# Patient Record
Sex: MALE | Race: WHITE | Employment: UNEMPLOYED | URBAN - METROPOLITAN AREA
[De-identification: names, ages, dates, MRNs, and addresses within clinical notes are randomized per-mention and may not be internally consistent; named-entity substitution may affect disease eponyms.]

---

## 2022-01-06 ENCOUNTER — HOSPITAL ENCOUNTER (INPATIENT)
Age: 42
LOS: 7 days | Discharge: OTHER INSTITUTION | DRG: 897 | End: 2022-01-13
Attending: PSYCHIATRY & NEUROLOGY | Admitting: PSYCHIATRY & NEUROLOGY
Payer: COMMERCIAL

## 2022-01-06 DIAGNOSIS — F29 PSYCHOSIS, UNSPECIFIED PSYCHOSIS TYPE (HCC): Primary | ICD-10-CM

## 2022-01-06 PROBLEM — F10.959: Status: ACTIVE | Noted: 2022-01-06

## 2022-01-06 LAB
ACETAMINOPHEN LEVEL: <5 UG/ML (ref 10–30)
ALBUMIN SERPL-MCNC: 5 G/DL (ref 3.5–4.6)
ALP BLD-CCNC: 96 U/L (ref 35–104)
ALT SERPL-CCNC: 21 U/L (ref 0–41)
AMPHETAMINE SCREEN, URINE: POSITIVE
ANION GAP SERPL CALCULATED.3IONS-SCNC: 10 MEQ/L (ref 9–15)
AST SERPL-CCNC: 17 U/L (ref 0–40)
BARBITURATE SCREEN URINE: ABNORMAL
BASOPHILS ABSOLUTE: 0 K/UL (ref 0–0.2)
BASOPHILS RELATIVE PERCENT: 0.4 %
BENZODIAZEPINE SCREEN, URINE: ABNORMAL
BILIRUB SERPL-MCNC: 0.5 MG/DL (ref 0.2–0.7)
BILIRUBIN URINE: NEGATIVE
BLOOD, URINE: NEGATIVE
BUN BLDV-MCNC: 17 MG/DL (ref 6–20)
CALCIUM SERPL-MCNC: 10.4 MG/DL (ref 8.5–9.9)
CANNABINOID SCREEN URINE: ABNORMAL
CHLORIDE BLD-SCNC: 96 MEQ/L (ref 95–107)
CHOLESTEROL, TOTAL: 225 MG/DL (ref 0–199)
CLARITY: CLEAR
CO2: 27 MEQ/L (ref 20–31)
COCAINE METABOLITE SCREEN URINE: ABNORMAL
COLOR: YELLOW
CREAT SERPL-MCNC: 1.01 MG/DL (ref 0.7–1.2)
EOSINOPHILS ABSOLUTE: 0 K/UL (ref 0–0.7)
EOSINOPHILS RELATIVE PERCENT: 0.1 %
ETHANOL PERCENT: NORMAL G/DL
ETHANOL: <10 MG/DL (ref 0–0.08)
GFR AFRICAN AMERICAN: >60
GFR NON-AFRICAN AMERICAN: >60
GLOBULIN: 3.1 G/DL (ref 2.3–3.5)
GLUCOSE BLD-MCNC: 137 MG/DL (ref 70–99)
GLUCOSE URINE: NEGATIVE MG/DL
HCT VFR BLD CALC: 48.8 % (ref 42–52)
HDLC SERPL-MCNC: 82 MG/DL (ref 40–59)
HEMOGLOBIN: 16.8 G/DL (ref 14–18)
KETONES, URINE: ABNORMAL MG/DL
LDL CHOLESTEROL CALCULATED: 132 MG/DL (ref 0–129)
LEUKOCYTE ESTERASE, URINE: NEGATIVE
LYMPHOCYTES ABSOLUTE: 0.8 K/UL (ref 1–4.8)
LYMPHOCYTES RELATIVE PERCENT: 6.9 %
Lab: ABNORMAL
MCH RBC QN AUTO: 30.4 PG (ref 27–31.3)
MCHC RBC AUTO-ENTMCNC: 34.3 % (ref 33–37)
MCV RBC AUTO: 88.5 FL (ref 80–100)
METHADONE SCREEN, URINE: ABNORMAL
MONOCYTES ABSOLUTE: 0.7 K/UL (ref 0.2–0.8)
MONOCYTES RELATIVE PERCENT: 6.5 %
NEUTROPHILS ABSOLUTE: 9.4 K/UL (ref 1.4–6.5)
NEUTROPHILS RELATIVE PERCENT: 86.1 %
NITRITE, URINE: NEGATIVE
OPIATE SCREEN URINE: ABNORMAL
OXYCODONE URINE: ABNORMAL
PDW BLD-RTO: 13.5 % (ref 11.5–14.5)
PH UA: 5.5 (ref 5–9)
PHENCYCLIDINE SCREEN URINE: ABNORMAL
PLATELET # BLD: 395 K/UL (ref 130–400)
POTASSIUM SERPL-SCNC: 4.4 MEQ/L (ref 3.4–4.9)
PROPOXYPHENE SCREEN: ABNORMAL
PROTEIN UA: NEGATIVE MG/DL
RBC # BLD: 5.52 M/UL (ref 4.7–6.1)
SALICYLATE, SERUM: <0.3 MG/DL (ref 15–30)
SARS-COV-2, NAAT: NOT DETECTED
SODIUM BLD-SCNC: 133 MEQ/L (ref 135–144)
SPECIFIC GRAVITY UA: 1.03 (ref 1–1.03)
TOTAL CK: 121 U/L (ref 0–190)
TOTAL PROTEIN: 8.1 G/DL (ref 6.3–8)
TRIGL SERPL-MCNC: 56 MG/DL (ref 0–150)
TSH SERPL DL<=0.05 MIU/L-ACNC: 2.24 UIU/ML (ref 0.44–3.86)
URINE REFLEX TO CULTURE: ABNORMAL
UROBILINOGEN, URINE: 1 E.U./DL
WBC # BLD: 10.9 K/UL (ref 4.8–10.8)

## 2022-01-06 PROCEDURE — 82077 ASSAY SPEC XCP UR&BREATH IA: CPT

## 2022-01-06 PROCEDURE — 84443 ASSAY THYROID STIM HORMONE: CPT

## 2022-01-06 PROCEDURE — 96372 THER/PROPH/DIAG INJ SC/IM: CPT

## 2022-01-06 PROCEDURE — 99284 EMERGENCY DEPT VISIT MOD MDM: CPT

## 2022-01-06 PROCEDURE — 80179 DRUG ASSAY SALICYLATE: CPT

## 2022-01-06 PROCEDURE — 87635 SARS-COV-2 COVID-19 AMP PRB: CPT

## 2022-01-06 PROCEDURE — 85025 COMPLETE CBC W/AUTO DIFF WBC: CPT

## 2022-01-06 PROCEDURE — 80143 DRUG ASSAY ACETAMINOPHEN: CPT

## 2022-01-06 PROCEDURE — 1240000000 HC EMOTIONAL WELLNESS R&B

## 2022-01-06 PROCEDURE — 80307 DRUG TEST PRSMV CHEM ANLYZR: CPT

## 2022-01-06 PROCEDURE — 80053 COMPREHEN METABOLIC PANEL: CPT

## 2022-01-06 PROCEDURE — 36415 COLL VENOUS BLD VENIPUNCTURE: CPT

## 2022-01-06 PROCEDURE — 81003 URINALYSIS AUTO W/O SCOPE: CPT

## 2022-01-06 PROCEDURE — 6360000002 HC RX W HCPCS

## 2022-01-06 PROCEDURE — 82550 ASSAY OF CK (CPK): CPT

## 2022-01-06 PROCEDURE — 80061 LIPID PANEL: CPT

## 2022-01-06 RX ORDER — HALOPERIDOL 5 MG
5 TABLET ORAL EVERY 6 HOURS PRN
Status: DISCONTINUED | OUTPATIENT
Start: 2022-01-06 | End: 2022-01-13 | Stop reason: HOSPADM

## 2022-01-06 RX ORDER — HALOPERIDOL 5 MG/ML
10 INJECTION INTRAMUSCULAR ONCE
Status: COMPLETED | OUTPATIENT
Start: 2022-01-06 | End: 2022-01-06

## 2022-01-06 RX ORDER — LORAZEPAM 2 MG/ML
INJECTION INTRAMUSCULAR
Status: COMPLETED
Start: 2022-01-06 | End: 2022-01-06

## 2022-01-06 RX ORDER — LORAZEPAM 2 MG/ML
2 INJECTION INTRAMUSCULAR ONCE
Status: COMPLETED | OUTPATIENT
Start: 2022-01-06 | End: 2022-01-06

## 2022-01-06 RX ORDER — ACETAMINOPHEN 325 MG/1
650 TABLET ORAL EVERY 4 HOURS PRN
Status: DISCONTINUED | OUTPATIENT
Start: 2022-01-06 | End: 2022-01-13 | Stop reason: HOSPADM

## 2022-01-06 RX ORDER — LORAZEPAM 2 MG/ML
2 INJECTION INTRAMUSCULAR ONCE
Status: DISCONTINUED | OUTPATIENT
Start: 2022-01-06 | End: 2022-01-06

## 2022-01-06 RX ORDER — DIPHENHYDRAMINE HYDROCHLORIDE 50 MG/ML
50 INJECTION INTRAMUSCULAR; INTRAVENOUS ONCE
Status: DISCONTINUED | OUTPATIENT
Start: 2022-01-06 | End: 2022-01-06

## 2022-01-06 RX ORDER — HALOPERIDOL 5 MG/ML
5 INJECTION INTRAMUSCULAR EVERY 6 HOURS PRN
Status: DISCONTINUED | OUTPATIENT
Start: 2022-01-06 | End: 2022-01-06

## 2022-01-06 RX ORDER — TRAZODONE HYDROCHLORIDE 50 MG/1
50 TABLET ORAL NIGHTLY PRN
Status: DISCONTINUED | OUTPATIENT
Start: 2022-01-06 | End: 2022-01-12

## 2022-01-06 RX ORDER — BENZTROPINE MESYLATE 1 MG/ML
2 INJECTION INTRAMUSCULAR; INTRAVENOUS 2 TIMES DAILY PRN
Status: DISCONTINUED | OUTPATIENT
Start: 2022-01-06 | End: 2022-01-13 | Stop reason: HOSPADM

## 2022-01-06 RX ORDER — HYDROXYZINE HYDROCHLORIDE 50 MG/ML
50 INJECTION, SOLUTION INTRAMUSCULAR EVERY 6 HOURS PRN
Status: DISCONTINUED | OUTPATIENT
Start: 2022-01-06 | End: 2022-01-13 | Stop reason: HOSPADM

## 2022-01-06 RX ORDER — MAGNESIUM HYDROXIDE/ALUMINUM HYDROXICE/SIMETHICONE 120; 1200; 1200 MG/30ML; MG/30ML; MG/30ML
30 SUSPENSION ORAL PRN
Status: DISCONTINUED | OUTPATIENT
Start: 2022-01-06 | End: 2022-01-13 | Stop reason: HOSPADM

## 2022-01-06 RX ORDER — HYDROXYZINE PAMOATE 50 MG/1
50 CAPSULE ORAL EVERY 6 HOURS PRN
Status: DISCONTINUED | OUTPATIENT
Start: 2022-01-06 | End: 2022-01-13 | Stop reason: HOSPADM

## 2022-01-06 RX ORDER — HALOPERIDOL 5 MG/ML
INJECTION INTRAMUSCULAR
Status: COMPLETED
Start: 2022-01-06 | End: 2022-01-06

## 2022-01-06 RX ORDER — HALOPERIDOL 5 MG/ML
5 INJECTION INTRAMUSCULAR EVERY 6 HOURS PRN
Status: DISCONTINUED | OUTPATIENT
Start: 2022-01-06 | End: 2022-01-13 | Stop reason: HOSPADM

## 2022-01-06 RX ORDER — DIPHENHYDRAMINE HYDROCHLORIDE 50 MG/ML
INJECTION INTRAMUSCULAR; INTRAVENOUS
Status: COMPLETED
Start: 2022-01-06 | End: 2022-01-06

## 2022-01-06 RX ORDER — DIPHENHYDRAMINE HYDROCHLORIDE 50 MG/ML
50 INJECTION INTRAMUSCULAR; INTRAVENOUS ONCE
Status: COMPLETED | OUTPATIENT
Start: 2022-01-06 | End: 2022-01-06

## 2022-01-06 RX ORDER — NICOTINE 21 MG/24HR
1 PATCH, TRANSDERMAL 24 HOURS TRANSDERMAL DAILY
Status: DISCONTINUED | OUTPATIENT
Start: 2022-01-06 | End: 2022-01-13 | Stop reason: HOSPADM

## 2022-01-06 RX ADMIN — HALOPERIDOL 10 MG: 5 INJECTION INTRAMUSCULAR at 11:46

## 2022-01-06 RX ADMIN — DIPHENHYDRAMINE HYDROCHLORIDE 50 MG: 50 INJECTION, SOLUTION INTRAMUSCULAR; INTRAVENOUS at 11:47

## 2022-01-06 RX ADMIN — LORAZEPAM 2 MG: 2 INJECTION INTRAMUSCULAR at 11:49

## 2022-01-06 RX ADMIN — HALOPERIDOL LACTATE 10 MG: 5 INJECTION, SOLUTION INTRAMUSCULAR at 11:46

## 2022-01-06 RX ADMIN — LORAZEPAM 2 MG: 2 INJECTION INTRAMUSCULAR; INTRAVENOUS at 11:49

## 2022-01-06 RX ADMIN — DIPHENHYDRAMINE HYDROCHLORIDE 50 MG: 50 INJECTION INTRAMUSCULAR; INTRAVENOUS at 11:47

## 2022-01-06 ASSESSMENT — ENCOUNTER SYMPTOMS
EYE PAIN: 0
SHORTNESS OF BREATH: 0
PHOTOPHOBIA: 0
BACK PAIN: 0
NAUSEA: 0
ABDOMINAL PAIN: 0
SORE THROAT: 0
RHINORRHEA: 0
COUGH: 0
DIARRHEA: 0
VOMITING: 0

## 2022-01-06 ASSESSMENT — SLEEP AND FATIGUE QUESTIONNAIRES
SLEEP PATTERN: DIFFICULTY FALLING ASLEEP;RESTLESSNESS;INSOMNIA
AVERAGE NUMBER OF SLEEP HOURS: 4
DO YOU HAVE DIFFICULTY SLEEPING: YES
RESTFUL SLEEP: NO
DIFFICULTY FALLING ASLEEP: YES
DIFFICULTY ARISING: NO
DO YOU USE A SLEEP AID: NO
DIFFICULTY STAYING ASLEEP: YES
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
## 2022-01-06 NOTE — ED NOTES
Resting on bed. Deep regular respirations. No s/s of distress. Yoly Waterman  01/06/22 7893 [facility]  Frank Waterman  01/06/22 6295
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
## 2022-01-06 NOTE — PROGRESS NOTES
Pt admitted to 3W from [facility]. Skin check and contraband check completed with Keila Franks RN. Both negative. Pt oriented to room. Pt did not want to leave the room for tour due to paranoia. Pt did not want to go to day room for meal. Pt meal brought to his room and pt ate all of his dinner. Pt educated on processes of unit. Pt aware of admission process and said he was willing to talk to this nurse.

## 2022-01-06 NOTE — ED NOTES
Information sent to [facility] at [facility] to request indigent bed. Dianne Valdez  01/06/22 0945

## 2022-01-06 NOTE — ED PROVIDER NOTES
[facility] ED  eMERGENCY dEPARTMENTeNCOUnter      Pt Name: Venita Layne  MRN: [id number]  [place] 1980  Date ofevaluation: 1/6/2022  Provider: Jass Velasco PA-C    CHIEF COMPLAINT       Chief Complaint   Patient presents with    Suicidal     sitting on bridge with feet over railing when passer by stopped and talked him away         HISTORY OF PRESENT ILLNESS   (Location/Symptom, Timing/Onset,Context/Setting, Quality, Duration, Modifying Factors, Severity)  Note limiting factors. Venita Layne is a 39 y.o. male who presents to the emergency department suicidal ideation and paranoia. Pt did methamphetimines. He went to a bridge to kill himself and someone brought him here. He is yelling and saying we are racist. He also expresses that a cartel is after him. HPI    NursingNotes were reviewed. REVIEW OF SYSTEMS    (2-9 systems for level 4, 10 or more for level 5)     Review of Systems   Constitutional: Negative for chills, diaphoresis, fatigue and fever. HENT: Negative for congestion, rhinorrhea and sore throat. Eyes: Negative for photophobia and pain. Respiratory: Negative for cough and shortness of breath. Cardiovascular: Negative for chest pain and palpitations. Gastrointestinal: Negative for abdominal pain, diarrhea, nausea and vomiting. Genitourinary: Negative for dysuria and flank pain. Musculoskeletal: Negative for back pain. Skin: Negative for rash. Neurological: Negative for dizziness, light-headedness and headaches. Psychiatric/Behavioral: Positive for behavioral problems, hallucinations and suicidal ideas. All other systems reviewed and are negative. Except as noted above the remainder of the review of systems was reviewed and negative. PAST MEDICAL HISTORY   No past medical history on file.       SURGICALHISTORY       Past Surgical History:   Procedure Laterality Date    HYDROCELE EXCISION           CURRENT MEDICATIONS       Previous Medications    No medications on file       ALLERGIES     Patient has no known allergies. FAMILY HISTORY     No family history on file. SOCIAL HISTORY       Social History     Socioeconomic History    Marital status: Single     Spouse name: Not on file    Number of children: Not on file    Years of education: Not on file    Highest education level: Not on file   Occupational History    Not on file   Tobacco Use    Smoking status: Current Every Day Smoker     Packs/day: 0.75     Years: 15.00     Pack years: 11.25     Types: Cigarettes    Smokeless tobacco: Never Used   Vaping Use    Vaping Use: Never used   Substance and Sexual Activity    Alcohol use: Never    Drug use: Yes     Types: Methamphetamines (Crystal Meth)    Sexual activity: Not on file   Other Topics Concern    Not on file   Social History Narrative    Not on file     Social Determinants of Health     Financial Resource Strain:     Difficulty of Paying Living Expenses: Not on file   Food Insecurity:     Worried About Running Out of Food in the Last Year: Not on file    Rina of Food in the Last Year: Not on file   Transportation Needs:     Lack of Transportation (Medical): Not on file    Lack of Transportation (Non-Medical):  Not on file   Physical Activity:     Days of Exercise per Week: Not on file    Minutes of Exercise per Session: Not on file   Stress:     Feeling of Stress : Not on file   Social Connections:     Frequency of Communication with Friends and Family: Not on file    Frequency of Social Gatherings with Friends and Family: Not on file    Attends Voodoo Services: Not on file    Active Member of Clubs or Organizations: Not on file    Attends Club or Organization Meetings: Not on file    Marital Status: Not on file   Intimate Partner Violence:     Fear of Current or Ex-Partner: Not on file    Emotionally Abused: Not on file    Physically Abused: Not on file    Sexually Abused: Not on file   Housing Stability:     Unable to Pay for Housing in the Last Year: Not on file    Number of Places Lived in the Last Year: Not on file    Unstable Housing in the Last Year: Not on file       SCREENINGS      @FLOW([id number])@      PHYSICAL EXAM    (up to 7 for level 4, 8 or more for level 5)     ED Triage Vitals [01/06/22 0858]   BP Temp Temp Source Pulse Resp SpO2 Height Weight   (!) 160/95 97.6 °F (36.4 °C) Oral 105 18 95 % 5' 11\" (1.803 m) 165 lb (74.8 kg)       Physical Exam  Vitals and nursing note reviewed. Constitutional:       General: He is not in acute distress. Appearance: Normal appearance. He is well-developed. He is not diaphoretic. HENT:      Head: Normocephalic and atraumatic. Eyes:      General: Lids are normal.      Conjunctiva/sclera: Conjunctivae normal.   Cardiovascular:      Rate and Rhythm: Normal rate and regular rhythm. Pulses: Normal pulses. Heart sounds: Normal heart sounds. Pulmonary:      Effort: Pulmonary effort is normal.      Breath sounds: Normal breath sounds. Abdominal:      General: Bowel sounds are normal.      Palpations: Abdomen is soft. Tenderness: There is no abdominal tenderness. Musculoskeletal:      Cervical back: Normal range of motion and neck supple. Lymphadenopathy:      Cervical: No cervical adenopathy. Skin:     General: Skin is warm and dry. Capillary Refill: Capillary refill takes less than 2 seconds. Findings: No rash. Neurological:      Mental Status: He is alert and oriented to person, place, and time. Psychiatric:         Speech: Speech is tangential.         Behavior: Behavior is agitated. Thought Content: Thought content includes suicidal ideation. Thought content includes suicidal plan. Judgment: Judgment is impulsive and inappropriate.          DIAGNOSTIC RESULTS     EKG: All EKG's are interpreted by the Emergency Department Physician who either signs or Co-signsthis chart in the absence of a cardiologist.        RADIOLOGY:   [place] such as CT, Ultrasound and MRI are read by the radiologist. Plain radiographic images are visualized and preliminarily interpreted by the emergency physician with the below findings:        Interpretation per the Radiologist below, if available at the time ofthis note:    No orders to display         ED BEDSIDE ULTRASOUND:   Performed by ED Physician - none    LABS:  Labs Reviewed   ACETAMINOPHEN LEVEL - Abnormal; Notable for the following components:       Result Value    Acetaminophen Level <5 (*)     All other components within normal limits   CBC WITH AUTO DIFFERENTIAL - Abnormal; Notable for the following components:    WBC 10.9 (*)     Neutrophils Absolute 9.4 (*)     Lymphocytes Absolute 0.8 (*)     All other components within normal limits   COMPREHENSIVE METABOLIC PANEL - Abnormal; Notable for the following components:    Sodium 133 (*)     Glucose 137 (*)     Calcium 10.4 (*)     Total Protein 8.1 (*)     Albumin 5.0 (*)     All other components within normal limits   LIPID PANEL - Abnormal; Notable for the following components:    Cholesterol, Total 225 (*)     HDL 82 (*)     LDL Calculated 132 (*)     All other components within normal limits   SALICYLATE LEVEL - Abnormal; Notable for the following components:    Salicylate, Serum <9.1 (*)     All other components within normal limits   URINE DRUG SCREEN - Abnormal; Notable for the following components:    Amphetamine Screen, Urine POSITIVE (*)     All other components within normal limits   URINE RT REFLEX TO CULTURE - Abnormal; Notable for the following components:    Ketones, Urine TRACE (*)     All other components within normal limits   COVID-19, RAPID   CK   ETHANOL   TSH WITHOUT REFLEX       All other labs were within normal range or not returned as of this dictation.     EMERGENCY DEPARTMENT COURSE and DIFFERENTIAL DIAGNOSIS/MDM:   Vitals:    Vitals:    01/06/22 0858   BP: (!) 160/95   Pulse: 105   Resp: 18   Temp: 97.6 °F (36.4 °C)   TempSrc: Oral   SpO2: 95%   Weight: 165 lb (74.8 kg)   Height: 5' 11\" (1.803 m)           MDM  Pt pink slipped. Pt became agitated tried to escape dariel, tried to verbally calm him, unsuccessful, medication was ordered by Dr. Aric Diaz   Pt admitted to [facility] 30 time was  minutes, excluding separatelyreportable procedures. There was a high probability ofclinically significant/life threatening deterioration in the patient's condition which required my urgent intervention. CONSULTS:  IP CONSULT TO HOSPITALIST  IP CONSULT TO SOCIAL WORK    PROCEDURES:  Unless otherwise noted below, none     Procedures    FINAL IMPRESSION      1. Psychosis, unspecified psychosis type [facility])          DISPOSITION/PLAN   DISPOSITION Admitted 01/06/2022 04:39:58 PM      PATIENT REFERREDTO:  No follow-up provider specified.     DISCHARGEMEDICATIONS:  New Prescriptions    No medications on file          (Please note that portions of this note were completed with a voice recognition program.  Efforts were made to edit the dictations but occasionally words are mis-transcribed.)    Cheryle Lovelace PA-C (electronically signed)  Attending Emergency Physician         Cheryle Lovelace PA-C  01/06/22 3054

## 2022-01-06 NOTE — PROGRESS NOTES
Received report from [place] in the [facility]. Pt to admit to 3W, room 368.

## 2022-01-07 PROCEDURE — 1240000000 HC EMOTIONAL WELLNESS R&B

## 2022-01-07 PROCEDURE — 6370000000 HC RX 637 (ALT 250 FOR IP): Performed by: PSYCHIATRY & NEUROLOGY

## 2022-01-07 PROCEDURE — 99223 1ST HOSP IP/OBS HIGH 75: CPT | Performed by: PSYCHIATRY & NEUROLOGY

## 2022-01-07 RX ORDER — RISPERIDONE 1 MG/1
1 TABLET, FILM COATED ORAL 2 TIMES DAILY
Status: DISCONTINUED | OUTPATIENT
Start: 2022-01-07 | End: 2022-01-10

## 2022-01-07 RX ADMIN — RISPERIDONE 1 MG: 1 TABLET ORAL at 12:17

## 2022-01-07 RX ADMIN — HYDROXYZINE PAMOATE 50 MG: 50 CAPSULE ORAL at 08:25

## 2022-01-07 RX ADMIN — RISPERIDONE 1 MG: 1 TABLET ORAL at 21:00

## 2022-01-07 ASSESSMENT — LIFESTYLE VARIABLES: HISTORY_ALCOHOL_USE: NO

## 2022-01-07 NOTE — PROGRESS NOTES
Pt remains sleeping in his bed, noted to have eaten a good breakfast, denied any complaints, encouraged groups.

## 2022-01-07 NOTE — PROGRESS NOTES
Pt ate 100% lunch, is now sleeping, resp even unlabored Risperdal 1mg given 1217 , pt asked if it makes his mens breast get large. pt was encouraged to talk with doctor about it.

## 2022-01-07 NOTE — PROGRESS NOTES
Behavioral Services  Medicare Certification Upon Admission    I certify that this patient's inpatient psychiatric hospital admission is medically necessary for:    [x] (1) Treatment which could reasonably be expected to improve this patient's condition,       [x] (2) Or for diagnostic study;     AND     [x](2) The inpatient psychiatric services are provided while the individual is under the care of a physician and are included in the individualized plan of care.     Estimated length of stay/service 3-5 days    Plan for post-hospital care OP care    Electronically signed by Lory Marquis MD on 1/7/2022 at 11:26 AM

## 2022-01-07 NOTE — CARE COORDINATION
Brief Intervention and Referral to Treatment Summary    Patient was provided PHQ-9, AUDIT and DAST Screening:      PHQ-9 Score: NC  AUDIT Score:  0  DAST Score:  0    Patients substance use is considered     Low Risk/Healthy x  Moderate Risk  Harmful  Dependent    Patients depression is considered:     Minimal  Mild   Moderate  Moderately Severe  Severe    Brief Education Was Provided    Patient was receptive  Patient was not receptivex      Brief Intervention Is Provided (Only for AUDIT or DAST)     Patient reports readiness to decrease and/or stop use and a plan was discussed   Patient denies readiness to decrease and/or stop use and a plan was not discussed      Recommendations/Referrals for Brief and/or Specialized Treatment Provided to Patient   Patient was negative for drugs and alcohol. Patient stated he is not connected to anyone.
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
## 2022-01-07 NOTE — PROGRESS NOTES
Pt abruptly got up from laying in bed, agreeable to a recheck in vs 107/70,132 ,pt is tense , pt expressed paranoia, stated they are out to get me, reports I don't have a home, doesn't sleep well, reports depression and anxiety #10, denies any suicidal thoughts, informed breakfast is coming soon.

## 2022-01-07 NOTE — PROGRESS NOTES
Patient did not attend wellness group despite encouragement by staff. Electronically signed by Abimbola Mayo on 1/7/2022 at 2:02 PM
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
## 2022-01-07 NOTE — CONSULTS
[facility]  MEDICINE    HISTORY AND PHYSICAL EXAM    PATIENT NAME:  Ivone Trinidad    MRN:  [id number]  SERVICE DATE:  1/7/2022   SERVICE TIME:  4:15 PM    Primary Care Physician: No primary care provider on file. SUBJECTIVE  CHIEF COMPLAINT:  Medically appropriate for inpatient psychiatry admission. Consult for medical H/P encounter. HPI:  This is a 39 y.o. male who presents with  PMHx methamphetamine use  presented to emergency room with SI and paranoid. Has been using methamphetamines. He was a bridge to kill himself and a bystander brought him to hospital.  Patient cleared from emergency room for psychiatric care. Patient Seen, Chart, Labs, Radiologystudies, and Consults reviewed. Patient denies headache, chest pain, shortness of breath, N/V/D/C, fever/chills. PAST MEDICAL HISTORY:  History reviewed. No pertinent past medical history. PAST SURGICAL HISTORY:    Past Surgical History:   Procedure Laterality Date    HYDROCELE EXCISION       FAMILY HISTORY:  History reviewed. No pertinent family history.   SOCIAL HISTORY:    Social History     Socioeconomic History    Marital status: Single     Spouse name: Not on file    Number of children: Not on file    Years of education: Not on file    Highest education level: Not on file   Occupational History    Not on file   Tobacco Use    Smoking status: Current Every Day Smoker     Packs/day: 0.75     Years: 15.00     Pack years: 11.25     Types: Cigarettes    Smokeless tobacco: Never Used   Vaping Use    Vaping Use: Never used   Substance and Sexual Activity    Alcohol use: Never    Drug use: Yes     Types: Methamphetamines (Crystal Meth)    Sexual activity: Not on file   Other Topics Concern    Not on file   Social History Narrative    Not on file     Social Determinants of Health     Financial Resource Strain:     Difficulty of Paying Living Expenses: Not on file   Food Insecurity:     Worried About Running Out of Food in the Last Year: Not on file    Ran Out of Food in the Last Year: Not on file   Transportation Needs:     Lack of Transportation (Medical): Not on file    Lack of Transportation (Non-Medical):  Not on file   Physical Activity:     Days of Exercise per Week: Not on file    Minutes of Exercise per Session: Not on file   Stress:     Feeling of Stress : Not on file   Social Connections:     Frequency of Communication with Friends and Family: Not on file    Frequency of Social Gatherings with Friends and Family: Not on file    Attends Taoism Services: Not on file    Active Member of [address] [place] [address] [employer] or Organizations: Not on file    Attends Club or Organization Meetings: Not on file    Marital Status: Not on file   Intimate Partner Violence:     Fear of Current or Ex-Partner: Not on file    Emotionally Abused: Not on file    Physically Abused: Not on file    Sexually Abused: Not on file   Housing Stability:     Unable to Pay for Housing in the Last Year: Not on file    Number of [place] in the Last Year: Not on file    Unstable Housing in the Last Year: Not on file     MEDICATIONS:    Current Facility-Administered Medications   Medication Dose Route Frequency Provider Last Rate Last Admin    risperiDONE (RISPERDAL) tablet 1 mg  1 mg Oral BID Ruth Layne MD   1 mg at 01/07/22 1217    acetaminophen (TYLENOL) tablet 650 mg  650 mg Oral Q4H PRN Ruth Layne MD        magnesium hydroxide (MILK OF MAGNESIA) 400 MG/5ML suspension 30 mL  30 mL Oral Daily PRN Ruth Layne MD        aluminum & magnesium hydroxide-simethicone (MAALOX) 200-200-20 MG/5ML suspension 30 mL  30 mL Oral PRN Ruth Layne MD        haloperidol (HALDOL) tablet 5 mg  5 mg Oral Q6H PRN Ruth Layne MD        Or    haloperidol lactate (HALDOL) injection 5 mg  5 mg IntraMUSCular Q6H PRN Ruth Layne MD        benztropine mesylate (COGENTIN) injection 2 mg  2 mg IntraMUSCular BID PRMD Gabriela Mcneal hydrOXYzine (VISTARIL) injection 50 mg  50 mg IntraMUSCular Q6H PRN Dawna Orellana MD        Or    hydrOXYzine (VISTARIL) capsule 50 mg  50 mg Oral Q6H PRN Dawna Orellana MD   50 mg at 01/07/22 0825    traZODone (DESYREL) tablet 50 mg  50 mg Oral Nightly PRN Dawna Orellana MD        nicotine (NICODERM CQ) 21 MG/24HR 1 patch  1 patch TransDERmal Daily Dawna Orellana MD        influenza quadrivalent split vaccine (FLUZONE;FLUARIX;FLULAVAL;AFLURIA) injection 0.5 mL  0.5 mL IntraMUSCular Prior to discharge Kevin Benítez APRN - NP           ALLERGIES: Patient has no known allergies. REVIEW OF SYSTEM:   ROS as noted in HPI, 12 point ROS reviewed and otherwise negative. OBJECTIVE  PHYSICAL EXAM: /70   Pulse 132 Comment: anxiety per pt, they are out to get me  Temp 97.5 °F (36.4 °C)   Resp 18   Ht 5' 11\" (1.803 m)   Wt 165 lb (74.8 kg)   SpO2 96%   BMI 23.01 kg/m²   CONSTITUTIONAL:  awake, alert, cooperative, no apparent distress, and appears stated age  EYES:  Lids and lashes normal, conjunctiva normal  ENT:  Normocephalic, without obvious abnormality, atraumatic, sinuses nontender on palpation, external ears without lesions, oral pharynx with moist mucus membranes, tonsils without erythema or exudates, gums normal and good dentition. NECK:  Supple, symmetrical, trachea midline  LUNGS: Clear to auscultation bilaterally, no crackles or wheezing  CARDIOVASCULAR: Regular rate and rhythm, normal S1 and S2  ABDOMEN:  Normal bowel sounds, soft, non-distended, non-tender  MUSCULOSKELETAL:  There is no redness, warmth, or swelling of the joints. NEUROLOGIC:  Awake, alert, oriented to name, place and time. SKIN: Warm and dry    DATA:     Diagnostic tests reviewed for today's visit:    Most recent labs and imaging results reviewed.       ASSESSMENT AND PLAN    Psychosis, alcoholic, with unspecified complication [facility])  Patient admitted to behavorial health for evaluation and treatment Substance abuse:  on cessation of methamphetamine use    Hyperlipidemia: Dietary changes    Nicotine dependence: Nicoderm patch.  on cessation    This is only a history and physical examination and not medical management. The patient is to contact and follow up with their primary care physician and go over any abnormal labs, imaging, findings, medical concerns, or conditions that we have and have not addressed during this encounter.     Plan of care discussed with: patient    SIGNATURE: SOHAIL Carcamo NP  DATE: January 7, 2022  TIME: 4:15 PM
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
## 2022-01-07 NOTE — FLOWSHEET NOTE
Patient has isolated to room all of day. Patient is irritable, appears suspicious and guarded. Patient is paranoid and feels \"the cartel is out to get me. \"  Patient denies SI/HI and hallucinations. Patient reports poor sleep and appetite is okay when tray taken to room. Patient has poor eye contact, has sad flat affect.

## 2022-01-07 NOTE — PROGRESS NOTES
Pt. declined to attend the 0900 community meeting, despite staff encouragement.  Did not state a goal. Electronically signed by Yulissa Henderson [address] on 1/7/2022 at 9:21 AM

## 2022-01-07 NOTE — PROGRESS NOTES
Patient did not attend group despite staff encouragement.   Electronically signed by Kristen Aguila on 1/6/2022 at 9:01 PM

## 2022-01-07 NOTE — PROGRESS NOTES
Pt. refused to attend the 1000 skills group, despite staff encouragement. Electronically signed by Sree Huffman [address] on 1/7/2022 at 11:21 AM

## 2022-01-07 NOTE — PROGRESS NOTES
Patient did not attend group despite staff encouragement.   Electronically signed by Shailesh Keys on 1/6/2022 at 8:46 PM

## 2022-01-07 NOTE — PROGRESS NOTES
Gave vistaril 50 mg for reports of anxiety #10 , pt denies cravings to use. Breakfast was set at bed side, pt remians in his room.

## 2022-01-08 PROCEDURE — 1240000000 HC EMOTIONAL WELLNESS R&B

## 2022-01-08 PROCEDURE — 6370000000 HC RX 637 (ALT 250 FOR IP): Performed by: PSYCHIATRY & NEUROLOGY

## 2022-01-08 PROCEDURE — 93005 ELECTROCARDIOGRAM TRACING: CPT

## 2022-01-08 RX ADMIN — RISPERIDONE 1 MG: 1 TABLET ORAL at 21:52

## 2022-01-08 RX ADMIN — RISPERIDONE 1 MG: 1 TABLET ORAL at 09:14

## 2022-01-08 NOTE — PROGRESS NOTES
Pt. refused to attend the 1000 skills group, despite staff encouragement. Electronically signed by Jimenez Harper, [address] on 1/8/2022 at 2:17 PM

## 2022-01-08 NOTE — PROGRESS NOTES
Patient did not attend healthy living group despite staff encouragement.  Electronically signed by Kaye Angelo RN on 1/8/22 at 3:30 PM EST

## 2022-01-08 NOTE — PROGRESS NOTES
Pt. declined to attend the 0900 community meeting, despite staff encouragement.  Goal - \"To see the Doctor\" Electronically signed by DOREEN Gilbert on 1/8/2022 at 9:43 AM
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
## 2022-01-08 NOTE — PROGRESS NOTES
Patient did not attend group despite staff encouragement.   Electronically signed by Ten Wade on 1/8/2022 at 5:09 PM

## 2022-01-08 NOTE — FLOWSHEET NOTE
Pt has been isolative to room. Avoids gaze. Guarded. Does not want to engage in conversation. Asks if he would be getting sent to [facility] from here. \" Says he feels fine. Denies SI/HI/AVH, depression and anxiety. Eats meals in his room. Encouraged to shower later. Does not attend groups.
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
## 2022-01-08 NOTE — PROGRESS NOTES
Isolative to room, out for dinner. Reports desire to smoke but declined the the Nicoderm patch. When asked if in pain, ct states, \"My psyche is in pain\", would not elaborate. Appears suspicious and was reading a book earlier but is now asleep requesting lights out. Does not report suicidal ideation at this time.

## 2022-01-08 NOTE — GROUP NOTE
Group Therapy Note    Date: 1/7/2022    Group Start Time: 8436  Group End Time: 1945  Group Topic: Recreational    [facility]    Devika Kumar RN; Rodrigo Slaughter RN    To participate in recreation group playing Wii video games with peers. Group Therapy Note    Attendees: 10/17         Patient's Goal:  To attend group. Notes:  Pt did not attend recreation group despite encouragement from staff.        Signature:  Devika Kumar RN

## 2022-01-09 PROCEDURE — 1240000000 HC EMOTIONAL WELLNESS R&B

## 2022-01-09 PROCEDURE — 6370000000 HC RX 637 (ALT 250 FOR IP): Performed by: PSYCHIATRY & NEUROLOGY

## 2022-01-09 RX ADMIN — RISPERIDONE 1 MG: 1 TABLET ORAL at 21:10

## 2022-01-09 RX ADMIN — RISPERIDONE 1 MG: 1 TABLET ORAL at 09:08

## 2022-01-09 NOTE — CARE COORDINATION
This  was approached by patient wanting to speak to someone from [employer]. Patient was given a pamphlet and asked to speak to someone. Patient signed release of information. I asked patient for collateral information and patient declined. I phoned [employer] today at [phone]. A representative will come out today.     .Electronically signed by CLAUDETTE Ribera on 1/9/2022 at 9:53 AM

## 2022-01-09 NOTE — PROGRESS NOTES
Patient did not attend group despite staff encouragement.   Electronically signed by Nilam Guerrero on 1/8/2022 at 9:57 PM

## 2022-01-09 NOTE — PROGRESS NOTES
Pt. refused to attend the 1000 skills group, despite staff encouragement. Electronically signed by Jennifer Hyde, [address] on 1/9/2022 at 12:03 PM

## 2022-01-09 NOTE — FLOWSHEET NOTE
Pt noted to be playing cards and watching TV with other peers during afternoon. When approached by this nurse, pt stopped abruptly asking if he was in trouble. Pt appears guarded, evasive with nurse. Flat affect. Noted to eat 100% supper. Short with nurse, reports he is doing fine and asked again if he was in trouble. Denies SI/HI/AVH. Noted to look around unit at times suspiciously.

## 2022-01-09 NOTE — PROGRESS NOTES
Morning Bishopssalbert 65 attended the morning community meeting on 1/9/22. Topics discussed today     [x] Introduction   Day of the week and date   Mask distribution   Current mask requirements  [x]Teams   Explanation of  Green and Blue team criteria   Nurses assigned to each team for today   Explanation about green and blue paper  o Date  o Patient's Name  o Patient's Nurse  o Goals  [x] Visitation   Announce the visiting hours for the day   Announce which team is allowed to have visitors for the day   Review any updated Covid 19 requirements for visitors during visitation  o Vaccine Card or negative Covid test within 48 hours of visit  o State Identification   Patients are reminded to alert the  at least 1 hour before visitation   [x] Unit Orientation   Coffee use   Phone location and etiquette   Shower locations  [employer] and dryer location and process   Common area expectations   Staff rounds expectation  [x] Meals    Educate patient to the menu  o The patient is encouraged to fill out the menu to get preferences at mealtime  o The patient is educated that if they do not fill out the menu, they will get the standard tray  o The coffee pot is decaf, patient encouraged to order regular coffee from menu.    Educate patient to the meal process   Patient encouraged to eat snacks provided twice daily  o Snacks may stay in patient room     [x] Discharge Process   Discharge expectations   Fill out the survey after discharge   [x] Hygiene   Daily showers encouraged  o Showers availability discussed    Daily dressing encouraged  o Discussed wearing street clothing   Education provided on where to place linens and clothing  o Linens in the hamper  o personal clothing does not go into the linen hamper  [x] Group    Patient encouraged to attend group provided   Time of Group Meetings discussed   Gentle reminder that attendance is a Physician order  [x] Movement   Chair exercises completed   Stretching completed  Notes:Goal - \"To see a \" Electronically signed by DOREEN Buckner on 1/9/2022 at 9:37 AM

## 2022-01-09 NOTE — PROGRESS NOTES
Pt more visible in dayroom today. Remains isolative to self while in day room. Continues with flat affect but less irritable and more talkative today. Reports \"fair\" sleep but a good appetite. Preoccupied with thoughts of cartel putting out the \"green light\" on him. Explains that the cartel have been ruining his life for 4 years and he is unable to correct this situation. Relates the cartel to his homelessness and lack of job. Denies any SI, HI or AVH. Admits to paranoia causing \"a lot\" of depression and anxiety. During interview, pt noted to be flirtatious with nurse and grandiose at times. States he would like to work with [facility] going forward.

## 2022-01-09 NOTE — GROUP NOTE
Group Therapy Note    Date: 1/9/2022    Group Start Time: 1630  Group End Time: 1700  Group Topic: Healthy Living/Wellness    [facility]    Lawson Cerna        Group Therapy Note    Attendees: 13/20         Patient's Goal:  To learn about self esteem through a game. Notes:  Patient participated in group discussion and game. Patient was supportive of peers.     Status After Intervention:  Improved    Participation Level: Interactive    Participation Quality: Appropriate, Attentive and Sharing      Speech:  normal      Thought Process/Content: Logical      Affective Functioning: Congruent      Mood: euthymic      Level of consciousness:  Alert and Attentive      Response to Learning: Able to verbalize current knowledge/experience      Endings: None Reported    Modes of Intervention: Education      Discipline Responsible: [address], [employer]      Signature:  Lawson Cerna

## 2022-01-09 NOTE — CARE COORDINATION
Group Therapy Note    Date: 1/9/2022  Start Time: 1100  End Time:  1200    Number of Participants:13    Type of Group: Psychotherapy    Patient's Goal:  To participate in a goal oriented group. Notes: Patient declined to attend psychoeducation group at 1100 despite encouragement by staff.      Discipline Responsible: /Counselor    CLAUDETTE Vincent

## 2022-01-09 NOTE — PROGRESS NOTES
Patient did not attend group despite staff encouragement.   Electronically signed by Bob Chun on 1/8/2022 at 9:40 PM

## 2022-01-10 PROBLEM — F22 DELUSIONAL DISORDER (HCC): Status: ACTIVE | Noted: 2022-01-06

## 2022-01-10 PROBLEM — F25.0 SCHIZOAFFECTIVE DISORDER, BIPOLAR TYPE (HCC): Status: ACTIVE | Noted: 2022-01-06

## 2022-01-10 PROBLEM — F15.90 STIMULANT USE DISORDER: Status: ACTIVE | Noted: 2022-01-10

## 2022-01-10 LAB
EKG ATRIAL RATE: 74 BPM
EKG P AXIS: 64 DEGREES
EKG P-R INTERVAL: 162 MS
EKG Q-T INTERVAL: 384 MS
EKG QRS DURATION: 100 MS
EKG QTC CALCULATION (BAZETT): 426 MS
EKG R AXIS: 84 DEGREES
EKG T AXIS: 70 DEGREES
EKG VENTRICULAR RATE: 74 BPM

## 2022-01-10 PROCEDURE — 90833 PSYTX W PT W E/M 30 MIN: CPT | Performed by: PSYCHIATRY & NEUROLOGY

## 2022-01-10 PROCEDURE — 6370000000 HC RX 637 (ALT 250 FOR IP): Performed by: PSYCHIATRY & NEUROLOGY

## 2022-01-10 PROCEDURE — 1240000000 HC EMOTIONAL WELLNESS R&B

## 2022-01-10 PROCEDURE — 99232 SBSQ HOSP IP/OBS MODERATE 35: CPT | Performed by: PSYCHIATRY & NEUROLOGY

## 2022-01-10 RX ADMIN — RISPERIDONE 1.5 MG: 1 TABLET ORAL at 10:12

## 2022-01-10 RX ADMIN — TRAZODONE HYDROCHLORIDE 50 MG: 50 TABLET ORAL at 21:42

## 2022-01-10 RX ADMIN — RISPERIDONE 1.5 MG: 1 TABLET ORAL at 20:43

## 2022-01-10 NOTE — GROUP NOTE
Group Therapy Note    Date: 1/9/2022    Group Start Time: 2030  Group End Time: 2115  Group Topic: Wrap-Up    [facility]    Joe Cevallos        Group Therapy Note    Attendees: 14/20         Patient's Goal:  \"to win the lotto. \" Patient then changed goal to \"talk to the . \"    Notes:  Patient reported meeting their goal for the day. Patient shared enjoying \"being the champion of [facility]. \"    Status After Intervention:  Unchanged    Participation Level: Interactive    Participation Quality: Appropriate, Attentive and Sharing      Speech:  normal      Thought Process/Content: Logical      Affective Functioning: Congruent      Mood: euthymic      Level of consciousness:  Alert and Attentive      Response to Learning: Progressing to goal      Endings: None Reported    Modes of Intervention: Support      Discipline Responsible: Breeze      Signature:  Joe Cevallos

## 2022-01-10 NOTE — GROUP NOTE
Group Therapy Note    Date: 1/10/2022    Group Start Time: 1400  Group End Time: 4248  Group Topic: Cognitive Skills    [facility]    CLAUDETTE Pardo        Group Therapy Note    Attendees: 13         Patient's Goal: To participate in mood management group. Notes:  Patient learned about Lissy's Social Needs. Status After Intervention:  Improved    Participation Level: Active Listener    Participation Quality: Appropriate and Attentive      Speech:  normal      Thought Process/Content: Logical      Affective Functioning: Congruent      Mood: elevated      Level of consciousness:  Alert      Response to Learning: Able to verbalize current knowledge/experience      Endings: None Reported    Modes of Intervention: Education      Discipline Responsible: /Counselor      Signature:   CLAUDETTE Pardo

## 2022-01-10 NOTE — GROUP NOTE
Group Therapy Note    Date: 1/10/2022    Group Start Time: 1100  Group End Time: 1150  Group Topic: Psychotherapy    [facility] ADAM Conrad, [facility]        Group Therapy Note    Attendees: 13       Patient's Goal:  To have a good discharge plan    Notes:  Patient was an active participant    Status After Intervention:  Improved    Participation Level: Interactive    Participation Quality: Appropriate      Speech:  normal      Thought Process/Content: Logical      Affective Functioning: Congruent      Mood: anxious      Level of consciousness:  Alert      Response to Learning: Progressing to goal      Endings: None Reported    Modes of Intervention: Support      Discipline Responsible: /Counselor      Signature:  Leanne Conrad, [facility]

## 2022-01-10 NOTE — PROGRESS NOTES
Nuha Verdugo 89. FOLLOW-UP NOTE       1/10/2022     Patient was seen and examined in person, Chart reviewed   Patient's case discussed with staff/team    Chief Complaint: Psychosis    Interim History:     Pt less focused on the drug cartel  Feel risperdal is helping him  Less anxious and paranoid  Still believe that people are following him and will do that after discharge  Pt was tired of living due to these people  Pt is currently homeless  Pt used Meth for the first time on Thursday after year of sobriety  No alcohol use   Open to sober living place  Lets get real staff coming today   Appetite:   [x] Normal/Unchanged  [] Increased  [] Decreased      Sleep:       [] Normal/Unchanged  [x] Fair       [] Poor              Energy:    [x] Normal/Unchanged  [] Increased  [] Decreased        SI [] Present  [x] Absent    HI  []Present  [x] Absent     Aggression:  [] yes  [] no    Patient is [] able  [] unable to CONTRACT FOR SAFETY     PAST MEDICAL/PSYCHIATRIC HISTORY:   History reviewed. No pertinent past medical history. FAMILY/SOCIAL HISTORY:  History reviewed. No pertinent family history.   Social History     Socioeconomic History    Marital status: Single     Spouse name: Not on file    Number of children: Not on file    Years of education: Not on file    Highest education level: Not on file   Occupational History    Not on file   Tobacco Use    Smoking status: Current Every Day Smoker     Packs/day: 0.75     Years: 15.00     Pack years: 11.25     Types: Cigarettes    Smokeless tobacco: Never Used   Vaping Use    Vaping Use: Never used   Substance and Sexual Activity    Alcohol use: Never    Drug use: Yes     Types: Methamphetamines (Crystal Meth)    Sexual activity: Not on file   Other Topics Concern    Not on file   Social History Narrative    Not on file     Social Determinants of Health     Financial Resource Strain:     Difficulty of Paying Living Expenses: Not on file   Food Insecurity:     Worried About [facility] in the Last Year: Not on file    Rina of Food in the Last Year: Not on file   Transportation Needs:     Lack of Transportation (Medical): Not on file    Lack of Transportation (Non-Medical): Not on file   Physical Activity:     Days of Exercise per Week: Not on file    Minutes of Exercise per Session: Not on file   Stress:     Feeling of Stress : Not on file   Social Connections:     Frequency of Communication with Friends and Family: Not on file    Frequency of Social Gatherings with Friends and Family: Not on file    Attends Jewish Services: Not on file    Active Member of [address] [place] [address] or Organizations: Not on file    Attends Club or Organization Meetings: Not on file    Marital Status: Not on file   Intimate Partner Violence:     Fear of Current or Ex-Partner: Not on file    Emotionally Abused: Not on file    Physically Abused: Not on file    Sexually Abused: Not on file   Housing Stability:     Unable to Pay for Housing in the Last Year: Not on file    Number of [place] in the Last Year: Not on file    Unstable Housing in the Last Year: Not on file           ROS:  [x] All negative/unchanged except if checked.  Explain positive(checked items) below:  [] Constitutional  [] Eyes  [] Ear/Nose/Mouth/Throat  [] Respiratory  [] CV  [] GI  []   [] Musculoskeletal  [] Skin/Breast  [] Neurological  [] Endocrine  [] Heme/Lymph  [] Allergic/Immunologic    Explanation:     MEDICATIONS:    Current Facility-Administered Medications:     risperiDONE (RISPERDAL) tablet 1.5 mg, 1.5 mg, Oral, BID, Kaylie Lindquist MD, 1.5 mg at 01/10/22 1012    acetaminophen (TYLENOL) tablet 650 mg, 650 mg, Oral, Q4H PRN, Navi Saleem MD    magnesium hydroxide (MILK OF MAGNESIA) 400 MG/5ML suspension 30 mL, 30 mL, Oral, Daily PRN, Navi Saleem MD    aluminum & magnesium hydroxide-simethicone (MAALOX) 845-647-36 MG/5ML suspension 30 mL, 30 mL, Oral, PRN, Mariana Lombardi MD    haloperidol (HALDOL) tablet 5 mg, 5 mg, Oral, Q6H PRN **OR** haloperidol lactate (HALDOL) injection 5 mg, 5 mg, IntraMUSCular, Q6H PRN, Mariana Lombardi MD    benztropine mesylate (COGENTIN) injection 2 mg, 2 mg, IntraMUSCular, BID PRN, Mariana Lombardi MD    hydrOXYzine (VISTARIL) injection 50 mg, 50 mg, IntraMUSCular, Q6H PRN **OR** hydrOXYzine (VISTARIL) capsule 50 mg, 50 mg, Oral, Q6H PRN, Mariana Lombardi MD, 50 mg at 01/07/22 0825    traZODone (DESYREL) tablet 50 mg, 50 mg, Oral, Nightly PRN, Mariana Lombardi MD    nicotine (NICODERM CQ) 21 MG/24HR 1 patch, 1 patch, TransDERmal, Daily, Mariana Lombardi MD    influenza quadrivalent split vaccine (FLUZONE;FLUARIX;FLULAVAL;AFLURIA) injection 0.5 mL, 0.5 mL, IntraMUSCular, Prior to discharge, SOHAIL Solomon NP      Examination:  /85   Pulse 104   Temp 98.6 °F (37 °C) (Oral)   Resp 18   Ht 5' 11\" (1.803 m)   Wt 165 lb (74.8 kg)   SpO2 97%   BMI 23.01 kg/m²   Gait - steady  Medication side effects(SE): no    Mental Status Examination:    Level of consciousness:  within normal limits   Appearance:  fair grooming and fair hygiene  Behavior/Motor:  no abnormalities noted  Attitude toward examiner:  cooperative  Speech:  spontaneous   Mood: dysthymic  Affect:  blunted  Thought processes:  linear   Thought content:  Delusions:  Less paranoid  Cognition:  oriented to person, place, and time   Concentration intact  Insight fair   Judgement fair     ASSESSMENT:   Patient symptoms are:  [] Well controlled  [] Improving  [] Worsening  [] No change      Diagnosis:   Principal Problem:    Delusional disorder ([address]  Active Problems:    Stimulant use disorder  Resolved Problems:    * No resolved hospital problems. *      LABS:    No results for input(s): WBC, HGB, PLT in the last 72 hours. No results for input(s): NA, K, CL, CO2, BUN, CREATININE, GLUCOSE in the last 72 hours.   No results for input(s): BILITOT, ALKPHOS, AST, ALT in the last 72 hours. Lab Results   Component Value Date    [facility] BEHAVIORAL HEALTH POSITIVE 01/06/2022    BARBSCNU Neg 01/06/2022    LABBENZ Neg 01/06/2022    LABMETH Neg 01/06/2022    OPIATESCREENURINE Neg 01/06/2022    PHENCYCLIDINESCREENURINE Neg 01/06/2022    ETOH <10 01/06/2022     Lab Results   Component Value Date    TSH 2.240 01/06/2022     No results found for: LITHIUM  No results found for: VALPROATE, CBMZ      Treatment Plan:  Reviewed current Medications with the patient. Med as ordered  Risks, benefits, side effects, drug-to-drug interactions and alternatives to treatment were discussed. Collateral information: pending  CD evaluation  Encourage patient to attend group and other milieu activities. Discharge planning discussed with the patient and treatment team.    PSYCHOTHERAPY/COUNSELING:  [x] Therapeutic interview  [x] Supportive  [] CBT  [] Ongoing  [] Other  Patient was seen 1:1 for 20 minutes, other than E&M time spent, focusing on      - coping skills techniques     - Anxiety management techniques discussed including deep breathing exercise and PMR     - discussing patients strength and weakness      - Motivational interviewing to assess the stage of change and assessing patient readiness to quit substance use.      - Focusing on negative cognition and maladaptive thoughts, which is feeding and maintaining the depression symptoms         [x] Patient continues to need, on a daily basis, active treatment furnished directly by or requiring the supervision of inpatient psychiatric personnel      Anticipated Length of stay:            Electronically signed by Philbert Dubin, MD on 1/10/2022 at 1:20 PM

## 2022-01-10 NOTE — GROUP NOTE
Group Therapy Note    Date: 1/10/2022    Group Start Time: 1300  Group End Time: 5035  Group Topic: Healthy Living/Wellness    [facility]    Anabel Bill RN        Group Therapy Note    Attendees: 14/20         Patient's Goal:   Learning to look for the positive. Status After Intervention:  Improved    Participation Level:  Active Listener    Participation Quality: Attentive      Speech:  normal      Thought Process/Content: Logical      Affective Functioning: Congruent      Mood: euthymic      Level of consciousness:  Oriented x4      Response to Learning: Able to verbalize/acknowledge new learning      Endings: None Reported    Modes of Intervention: Support, Socialization and Exploration      Discipline Responsible: Registered Nurse      Signature:  Anabel Bill RN

## 2022-01-10 NOTE — GROUP NOTE
Group Therapy Note    Date: 1/10/2022    Group Start Time: 3889  Group End Time: 1959  Group Topic: Healthy Living/Wellness    [facility]    Nj Horn        Group Therapy Note    Attendees: 14/20         Patient's Goal:  To learn about and practice positivity. Notes:  Patient was an active participant in group.     Status After Intervention:  Improved    Participation Level: Interactive    Participation Quality: Appropriate, Attentive, Sharing and Supportive      Speech:  normal      Thought Process/Content: Logical      Affective Functioning: Congruent      Mood: euthymic      Level of consciousness:  Alert and Attentive      Response to Learning: Able to verbalize current knowledge/experience      Endings: None Reported    Modes of Intervention: Education      Discipline Responsible: [address], [employer]      Signature:  Nj Horn

## 2022-01-10 NOTE — GROUP NOTE
Group Therapy Note    Date: 1/10/2022    Group Start Time: 1000  Group End Time: 1100  Group Topic: Psychoeducation    [facility]    Kandis Lee, SAVITAS        Group Therapy Note    Attendees: 15         Patient's Goal:  \"to speak with the \"    Notes:  Pt. attended the 1000 skill group. Pt. was flirtatious with other pts. Hyper at times. Status After Intervention:  Unchanged    Participation Level:  Active Listener and Interactive    Participation Quality: Appropriate, Attentive and Sharing      Speech:  normal and loud      Thought Process/Content: Logical      Affective Functioning: Congruent and Exaggerated      Mood: elevated and calm      Level of consciousness:  Alert, Oriented x4 and Attentive      Response to Learning: Able to change behavior and Progressing to goal      Endings: None Reported    Modes of Intervention: Education, Support, Socialization and Activity      Discipline Responsible: Psychoeducational Specialist      Signature:  Tamir Lopez

## 2022-01-10 NOTE — GROUP NOTE
Group Therapy Note    Date: 1/9/2022    Group Start Time: 1900  Group End Time: 1945  Group Topic: Recreational    [facility]    Delfina Amin        Group Therapy Note    Attendees: 13/20         Patient's Goal:  To watch a movie and socialize with peers. Notes:  Patient participated in activity group.     Status After Intervention:  Unchanged    Participation Level: Interactive    Participation Quality: Appropriate and Attentive      Speech:  normal      Thought Process/Content: Logical      Affective Functioning: Congruent      Mood: euthymic      Level of consciousness:  Alert and Attentive      Response to Learning: Progressing to goal      Endings: None Reported    Modes of Intervention: Activity      Discipline Responsible: [address], [employer]      Signature:  Delfina Amin

## 2022-01-10 NOTE — PROGRESS NOTES
HCT 48.8 01/06/2022     01/06/2022    MCV 88.5 01/06/2022    MCH 30.4 01/06/2022    MCHC 34.3 01/06/2022    RDW 13.5 01/06/2022    LYMPHOPCT 6.9 01/06/2022    MONOPCT 6.5 01/06/2022    BASOPCT 0.4 01/06/2022    MONOSABS 0.7 01/06/2022    LYMPHSABS 0.8 01/06/2022    EOSABS 0.0 01/06/2022    BASOSABS 0.0 01/06/2022     CMP:    Lab Results   Component Value Date     01/06/2022    K 4.4 01/06/2022    CL 96 01/06/2022    CO2 27 01/06/2022    BUN 17 01/06/2022    CREATININE 1.01 01/06/2022    GFRAA >60.0 01/06/2022    LABGLOM >60.0 01/06/2022    GLUCOSE 137 01/06/2022    PROT 8.1 01/06/2022    LABALBU 5.0 01/06/2022    CALCIUM 10.4 01/06/2022    BILITOT 0.5 01/06/2022    ALKPHOS 96 01/06/2022    AST 17 01/06/2022    ALT 21 01/06/2022     Urine Toxicology:  No components found for: IAMMENTA, IBARBIT, IBENZO, ICOCAINE, IMARTHC, IOPIATES, IPHENCYC    Medications  Current Facility-Administered Medications: risperiDONE (RISPERDAL) tablet 1 mg, 1 mg, Oral, BID  acetaminophen (TYLENOL) tablet 650 mg, 650 mg, Oral, Q4H PRN  magnesium hydroxide (MILK OF MAGNESIA) 400 MG/5ML suspension 30 mL, 30 mL, Oral, Daily PRN  aluminum & magnesium hydroxide-simethicone (MAALOX) 200-200-20 MG/5ML suspension 30 mL, 30 mL, Oral, PRN  haloperidol (HALDOL) tablet 5 mg, 5 mg, Oral, Q6H PRN **OR** haloperidol lactate (HALDOL) injection 5 mg, 5 mg, IntraMUSCular, Q6H PRN  benztropine mesylate (COGENTIN) injection 2 mg, 2 mg, IntraMUSCular, BID PRN  hydrOXYzine (VISTARIL) injection 50 mg, 50 mg, IntraMUSCular, Q6H PRN **OR** hydrOXYzine (VISTARIL) capsule 50 mg, 50 mg, Oral, Q6H PRN  traZODone (DESYREL) tablet 50 mg, 50 mg, Oral, Nightly PRN  nicotine (NICODERM CQ) 21 MG/24HR 1 patch, 1 patch, TransDERmal, Daily  influenza quadrivalent split vaccine (FLUZONE;FLUARIX;FLULAVAL;AFLURIA) injection 0.5 mL, 0.5 mL, IntraMUSCular, Prior to discharge    ASSESSMENT AND PLAN  RISK ASSESSMENT:     SUICIDE RISK ASSESSMENT: high  HOMICIDE: low  AGITATION/VIOLENCE: high  ELOPEMENT: low     DIAGNOSIS:schizoaffective bipolar disorder  Stimulant use disorder    PLAN: increase risperidone  . Josie Heard MD MD Psychiatry

## 2022-01-10 NOTE — PROGRESS NOTES
Awaken pt for am med to be given, pt informed  of increase of Risperdal, pt reports sleep is better, reports depression is still a lot because of his situation has not changed and if he returns back to that environment he will use again, pt is orient to date,reports he went to a group yesterday and thought they played favorites, pt appears to have a negative view . Groups were still encouraged, pt asked to be able to use his lap top computer, to play games on it.

## 2022-01-11 VITALS
OXYGEN SATURATION: 97 % | DIASTOLIC BLOOD PRESSURE: 54 MMHG | BODY MASS INDEX: 23.1 KG/M2 | HEART RATE: 112 BPM | SYSTOLIC BLOOD PRESSURE: 114 MMHG | HEIGHT: 71 IN | RESPIRATION RATE: 18 BRPM | TEMPERATURE: 98.7 F | WEIGHT: 165 LBS

## 2022-01-11 PROCEDURE — 99232 SBSQ HOSP IP/OBS MODERATE 35: CPT | Performed by: PSYCHIATRY & NEUROLOGY

## 2022-01-11 PROCEDURE — 6370000000 HC RX 637 (ALT 250 FOR IP): Performed by: PSYCHIATRY & NEUROLOGY

## 2022-01-11 PROCEDURE — 1240000000 HC EMOTIONAL WELLNESS R&B

## 2022-01-11 RX ADMIN — TRAZODONE HYDROCHLORIDE 50 MG: 50 TABLET ORAL at 20:52

## 2022-01-11 RX ADMIN — RISPERIDONE 1.5 MG: 1 TABLET ORAL at 10:07

## 2022-01-11 RX ADMIN — RISPERIDONE 1.5 MG: 1 TABLET ORAL at 20:52

## 2022-01-11 NOTE — PROGRESS NOTES
Pt expressed anger and frustration he was awoken for his am meds, but took the Risperdal, reports I just cant get any sleep around here, pt reports waking up frequently,

## 2022-01-11 NOTE — GROUP NOTE
Group Therapy Note    Date: 1/11/2022    Group Start Time: 6272  Group End Time: 2530  Group Topic: Psychotherapy    [facility] I    CHRISTIANE Garrett        Group Therapy Note    Attendees: 15/21         Patient's Goal:  \"find sober living and get established. Notes:  n/a    Status After Intervention:  Unchanged    Participation Level:  Active Listener and Interactive    Participation Quality: Appropriate      Speech:  normal      Thought Process/Content: Logical      Affective Functioning: Congruent      Mood: anxious      Level of consciousness:  Alert      Response to Learning: Progressing to goal      Endings: None Reported    Modes of Intervention: Education      Discipline Responsible: /Counselor      Signature:  CHRISTIANE Garrett

## 2022-01-11 NOTE — PROGRESS NOTES
Pt. refused to attend the 1000 skills group, despite staff encouragement. Electronically signed by Camryn Eckert, [address] on 1/11/2022 at 12:19 PM

## 2022-01-11 NOTE — PROGRESS NOTES
Nuha [facility]. FOLLOW-UP NOTE       1/11/2022     Patient was seen and examined in person, Chart reviewed   Patient's case discussed with staff/team    Chief Complaint: Psychosis    Interim History:     Pt report feeling better  Less intense paranoid thoughts  No active SI  Motivated to get help  Want to go to sober living  Tolerating medication    Appetite:   [x] Normal/Unchanged  [] Increased  [] Decreased      Sleep:       [] Normal/Unchanged  [x] Fair       [] Poor              Energy:    [x] Normal/Unchanged  [] Increased  [] Decreased        SI [] Present  [x] Absent    HI  []Present  [x] Absent     Aggression:  [] yes  [] no    Patient is [] able  [] unable to CONTRACT FOR SAFETY     PAST MEDICAL/PSYCHIATRIC HISTORY:   History reviewed. No pertinent past medical history. FAMILY/SOCIAL HISTORY:  History reviewed. No pertinent family history. Social History     Socioeconomic History    Marital status: Single     Spouse name: Not on file    Number of children: Not on file    Years of education: Not on file    Highest education level: Not on file   Occupational History    Not on file   Tobacco Use    Smoking status: Current Every Day Smoker     Packs/day: 0.75     Years: 15.00     Pack years: 11.25     Types: Cigarettes    Smokeless tobacco: Never Used   Vaping Use    Vaping Use: Never used   Substance and Sexual Activity    Alcohol use: Never    Drug use: Yes     Types: Methamphetamines (Crystal Meth)    Sexual activity: Not on file   Other Topics Concern    Not on file   Social History Narrative    Not on file     Social Determinants of Health     Financial Resource Strain:     Difficulty of Paying Living Expenses: Not on file   Food Insecurity:     Worried About Running Out of Food in the Last Year: Not on file    Rina of Food in the Last Year: Not on file   Transportation Needs:     Lack of Transportation (Medical):  Not on file    Lack of Transportation (Non-Medical): Not on file   Physical Activity:     Days of Exercise per Week: Not on file    Minutes of Exercise per Session: Not on file   Stress:     Feeling of Stress : Not on file   Social Connections:     Frequency of Communication with Friends and Family: Not on file    Frequency of Social Gatherings with Friends and Family: Not on file    Attends Jehovah's witness Services: Not on file    Active Member of [address] [place] [address] or Organizations: Not on file    Attends Club or Organization Meetings: Not on file    Marital Status: Not on file   Intimate Partner Violence:     Fear of Current or Ex-Partner: Not on file    Emotionally Abused: Not on file    Physically Abused: Not on file    Sexually Abused: Not on file   Housing Stability:     Unable to Pay for Housing in the Last Year: Not on file    Number of [place] in the Last Year: Not on file    Unstable Housing in the Last Year: Not on file           ROS:  [x] All negative/unchanged except if checked.  Explain positive(checked items) below:  [] Constitutional  [] Eyes  [] Ear/Nose/Mouth/Throat  [] Respiratory  [] CV  [] GI  []   [] Musculoskeletal  [] Skin/Breast  [] Neurological  [] Endocrine  [] Heme/Lymph  [] Allergic/Immunologic    Explanation:     MEDICATIONS:    Current Facility-Administered Medications:     risperiDONE (RISPERDAL) tablet 1.5 mg, 1.5 mg, Oral, BID, Kaylie Lindquist MD, 1.5 mg at 01/11/22 1007    acetaminophen (TYLENOL) tablet 650 mg, 650 mg, Oral, Q4H PRN, Navi Saleem MD    magnesium hydroxide (MILK OF MAGNESIA) 400 MG/5ML suspension 30 mL, 30 mL, Oral, Daily PRN, Navi Saleem MD    aluminum & magnesium hydroxide-simethicone (MAALOX) 200-200-20 MG/5ML suspension 30 mL, 30 mL, Oral, PRN, Navi Saleem MD    haloperidol (HALDOL) tablet 5 mg, 5 mg, Oral, Q6H PRN **OR** haloperidol lactate (HALDOL) injection 5 mg, 5 mg, IntraMUSCular, Q6H PRN, Navi Saleem MD    benztropine mesylate (COGENTIN) injection 2 mg, 2 mg, IntraMUSCular, BID PRN, José Luis Estes MD    hydrOXYzine (VISTARIL) injection 50 mg, 50 mg, IntraMUSCular, Q6H PRN **OR** hydrOXYzine (VISTARIL) capsule 50 mg, 50 mg, Oral, Q6H PRN, José Luis Estes MD, 50 mg at 01/07/22 0825    traZODone (DESYREL) tablet 50 mg, 50 mg, Oral, Nightly PRN, José Luis Estes MD, 50 mg at 01/10/22 2142    nicotine (NICODERM CQ) 21 MG/24HR 1 patch, 1 patch, TransDERmal, Daily, José Luis Estes MD    influenza quadrivalent split vaccine (FLUZONE;FLUARIX;FLULAVAL;AFLURIA) injection 0.5 mL, 0.5 mL, IntraMUSCular, Prior to discharge, SOHAIL Wilson - NP      Examination:  BP (!) 114/54 Comment: RN Notified  Pulse 112   Temp 97.7 °F (36.5 °C) (Oral)   Resp 18   Ht 5' 11\" (1.803 m)   Wt 165 lb (74.8 kg)   SpO2 97%   BMI 23.01 kg/m²   Gait - steady  Medication side effects(SE): no    Mental Status Examination:    Level of consciousness:  within normal limits   Appearance:  fair grooming and fair hygiene  Behavior/Motor:  no abnormalities noted  Attitude toward examiner:  cooperative  Speech:  spontaneous   Mood: dysthymic  Affect:  blunted  Thought processes:  linear   Thought content:  Delusions:  Less paranoid  Cognition:  oriented to person, place, and time   Concentration intact  Insight fair   Judgement fair     ASSESSMENT:   Patient symptoms are:  [] Well controlled  [] Improving  [] Worsening  [] No change      Diagnosis:   Principal Problem:    Delusional disorder ([facility] [address]  Active Problems:    Stimulant use disorder  Resolved Problems:    * No resolved hospital problems. *      LABS:    No results for input(s): WBC, HGB, PLT in the last 72 hours. No results for input(s): NA, K, CL, CO2, BUN, CREATININE, GLUCOSE in the last 72 hours. No results for input(s): BILITOT, ALKPHOS, AST, ALT in the last 72 hours.   Lab Results   Component Value Date    [address] POSITIVE 01/06/2022    BARBSCNU Neg 01/06/2022    LABBENZ Neg 01/06/2022    LABMETH Neg 01/06/2022    OPIATESCREENURINE Neg 01/06/2022    PHENCYCLIDINESCREENURINE Neg 01/06/2022    ETOH <10 01/06/2022     Lab Results   Component Value Date    TSH 2.240 01/06/2022     No results found for: LITHIUM  No results found for: VALPROATE, CBMZ      Treatment Plan:  Reviewed current Medications with the patient. Med as ordered  Risks, benefits, side effects, drug-to-drug interactions and alternatives to treatment were discussed. Collateral information: pending  CD evaluation  Encourage patient to attend group and other milieu activities.   Discharge planning discussed with the patient and treatment team.    PSYCHOTHERAPY/COUNSELING:  [x] Therapeutic interview  [x] Supportive  [] CBT  [] Ongoing  [] Other        [x] Patient continues to need, on a daily basis, active treatment furnished directly by or requiring the supervision of inpatient psychiatric personnel      Anticipated Length of stay:            Electronically signed by Jesus Sibley MD on 1/11/2022 at 4:04 PM

## 2022-01-11 NOTE — PROGRESS NOTES
Pt apologized this afternoon for his irritability this am, but states he didn't sleep, pt reports traz did not work and he didn't talk with doctor about it.

## 2022-01-11 NOTE — GROUP NOTE
Group Therapy Note    Date: 1/11/2022    Group Start Time: 5194  Group End Time: 1440  Group Topic: Cognitive Skills    [facility]    CHRISTIANE Sandoval        Group Therapy Note    Attendees: 11/17         Patient's Goal:  Identify a coping skill and practice    Notes:  n/a    Status After Intervention:  Unchanged    Participation Level:  Active Listener    Participation Quality: Appropriate      Speech:  normal      Thought Process/Content: Logical      Affective Functioning: Congruent      Mood: anxious and depressed      Level of consciousness:  Alert      Response to Learning: Progressing to goal      Endings: None Reported    Modes of Intervention: Education      Discipline Responsible: /Counselor      Signature:  CHRISTIANE Sandoval

## 2022-01-11 NOTE — PROGRESS NOTES
Patient is social yet unusual with peers. He seems to enjoy their company yet seems guarded. During 1:1 he is evasive about any details but is future oriented. He went to get his resume to share his experiences. He seemed grandiose and hopeful about local job and housing opportunities inquiring about likely wages and housing estimates. No SI, HI, or hallucinations voiced.

## 2022-01-11 NOTE — PROGRESS NOTES
Pt. declined to attend the 0900 community meeting, despite staff encouragement.  Did not state a goal. Electronically signed by Dano Starr, [address] on 1/11/2022 at 12:08 PM

## 2022-01-11 NOTE — GROUP NOTE
Group Therapy Note    Date: 1/10/2022    Group Start Time: 1900  Group End Time: 1955  Group Topic: Recreational    [facility]    Joe Cevallos        Group Therapy Note    Attendees: 13/21         Patient's Goal:  To watch a movie and socialize with peers. Notes:  Patient participated in activity group.     Status After Intervention:  Improved    Participation Level: Interactive    Participation Quality: Appropriate and Attentive      Speech:  normal      Thought Process/Content: Logical      Affective Functioning: Congruent      Mood: euthymic      Level of consciousness:  Alert and Attentive      Response to Learning: Progressing to goal      Endings: None Reported    Modes of Intervention: Activity      Discipline Responsible: [employer]      Signature:  Joe Cevallos

## 2022-01-11 NOTE — GROUP NOTE
Group Therapy Note    Date: 1/10/2022    Group Start Time: 2040  Group End Time: 2055  Group Topic: Wrap-Up    [facility]    Micheline Ponce        Group Therapy Note    Attendees: 11/21         Patient's Goal:  \"to speak with social work\"    Notes:  Patient reported meeting their goal for the day. Patient shared he has made 2 new friends today.     Status After Intervention:  Unchanged    Participation Level: Interactive    Participation Quality: Appropriate, Attentive and Sharing      Speech:  normal      Thought Process/Content: Logical      Affective Functioning: Congruent      Mood: euthymic      Level of consciousness:  Alert and Attentive      Response to Learning: Progressing to goal      Endings: None Reported    Modes of Intervention: Support      Discipline Responsible: [employer]      Signature:  Micheline Ponce

## 2022-01-12 PROCEDURE — 90833 PSYTX W PT W E/M 30 MIN: CPT | Performed by: PSYCHIATRY & NEUROLOGY

## 2022-01-12 PROCEDURE — 6370000000 HC RX 637 (ALT 250 FOR IP): Performed by: PSYCHIATRY & NEUROLOGY

## 2022-01-12 PROCEDURE — 1240000000 HC EMOTIONAL WELLNESS R&B

## 2022-01-12 PROCEDURE — 99232 SBSQ HOSP IP/OBS MODERATE 35: CPT | Performed by: PSYCHIATRY & NEUROLOGY

## 2022-01-12 RX ORDER — TRAZODONE HYDROCHLORIDE 100 MG/1
100 TABLET ORAL NIGHTLY
Status: DISCONTINUED | OUTPATIENT
Start: 2022-01-12 | End: 2022-01-13 | Stop reason: HOSPADM

## 2022-01-12 RX ADMIN — RISPERIDONE 1.5 MG: 1 TABLET ORAL at 20:47

## 2022-01-12 RX ADMIN — RISPERIDONE 1.5 MG: 1 TABLET ORAL at 08:56

## 2022-01-12 RX ADMIN — TRAZODONE HYDROCHLORIDE 100 MG: 100 TABLET ORAL at 20:47

## 2022-01-12 NOTE — GROUP NOTE
Group Therapy Note    Date: 1/11/2022    Group Start Time: 2130  Group End Time: 2145  Group Topic: Wrap-Up    [facility] ADAM Boyking        Group Therapy Note    Attendees: 7/18         Patient's Goal:  \"To meet with someone from [address] [place] [address] today\"    Notes:  Pt did not meet his goal for the day but is hopeful to meet with someone before discharge    Status After Intervention:  Unchanged    Participation Level:  Active Listener and Interactive    Participation Quality: Appropriate and Attentive      Speech:  normal      Thought Process/Content: Logical      Affective Functioning: Congruent      Mood: euthymic      Level of consciousness:  Alert and Oriented x4      Response to Learning: Able to verbalize current knowledge/experience      Endings: None Reported    Modes of Intervention: Socialization      Discipline Responsible: [employer]      Signature:  Casper Schreiber

## 2022-01-12 NOTE — GROUP NOTE
Group Therapy Note    Date: 1/12/2022    Group Start Time: 1000  Group End Time: 1050  Group Topic: Psychoeducation    [facility]    Winter Gilliland        Group Therapy Note    Attendees: 9         Patient's Goal:  \"To go home\"    Notes:  Patient attended the 1000 skills group. Patient was calmer, he focus fairly on his task and work independently in group.     Status After Intervention:  Improved    Participation Level: Fair    Participation Quality: Appropriate      Speech:  talkative      Thought Process/Content: Linear      Affective Functioning: Congruent      Mood: calmer      Level of consciousness:  Alert      Response to Learning: Able to retain information      Endings: None Reported    Modes of Intervention: Education, Socialization and Activity      Discipline Responsible: Psychoeducational Specialist      Signature:  Winter Gilliland

## 2022-01-12 NOTE — GROUP NOTE
Group Therapy Note    Date: 2022    Group Start Time: 1110  Group End Time: 1590  Group Topic: Psychotherapy    [facility]    [facility]        Group Therapy Note    Attendees:7         Patient's Goal:  ***    Notes:  ***    Status After Intervention:  {Status After Intervention:952122694}    Participation Level: {Participation Level:017772819}    Participation Quality: {[facility] PARTICIPATION QUALITY:591092827}      Speech:  {[facility] CD_SPEECH:75622}      Thought Process/Content: {Thought Process/Content:233502892}      Affective Functioning: {Affective Functionin}      Mood: {Mood:725749656}      Level of consciousness:  {Level of consciousness:583337698}      Response to Learnin [facility] Responses to Learnin}      Endings: {[facility] Endings:15235}    Modes of Intervention: {[facility] Modes of Intervention:381524605}      Discipline Responsible: {[facility] Multidisciplinary:470550229}      Signature:  [facility]

## 2022-01-12 NOTE — PROGRESS NOTES
Morning [address] 65 attended the morning community meeting on 1/12/22. Topics discussed today     [x] Introduction   Day of the week and date   Mask distribution   Current mask requirements  [x]Teams   Explanation of  Green and Blue team criteria   Nurses assigned to each team for today   Explanation about green and blue paper  o Date  o Patient's Name  o Patient's Nurse  o Goals  [x] Visitation   Announce the visiting hours for the day   Announce which team is allowed to have visitors for the day   Review any updated Covid 19 requirements for visitors during visitation  o Vaccine Card or negative Covid test within 48 hours of visit  o State Identification   Patients are reminded to alert the  at least 1 hour before visitation   [x] Unit Orientation   Coffee use   Phone location and etiquette   Shower locations  [employer] and dryer location and process   Common area expectations   Staff rounds expectation  [x] Meals    Educate patient to the menu  o The patient is encouraged to fill out the menu to get preferences at mealtime  o The patient is educated that if they do not fill out the menu, they will get the standard tray  o The coffee pot is decaf, patient encouraged to order regular coffee from menu.    Educate patient to the meal process   Patient encouraged to eat snacks provided twice daily  o Snacks may stay in patient room     [x] Discharge Process   Discharge expectations   Fill out the survey after discharge   [x] Hygiene   Daily showers encouraged  o Showers availability discussed    Daily dressing encouraged  o Discussed wearing street clothing   Education provided on where to place linens and clothing  o Linens in the hamper  o personal clothing does not go into the linen hamper  [x] Group    Patient encouraged to attend group provided   Time of Group Meetings discussed   Gentle reminder that attendance is a Physician order  [x] Movement   Chair exercises completed   Stretching completed  Notes:Goal - \"To not rip anyone's head off\" Electronically signed by DOREEN Johnson on 1/12/2022 at 9:36 AM

## 2022-01-12 NOTE — GROUP NOTE
Group Therapy Note    Date: 1/11/2022    Group Start Time: 1930  Group End Time: 2010  Group Topic: Healthy Living/Wellness    [facility]    Na Rutledge        Group Therapy Note    Attendees: 10/18     Patient's Goal:  Discussed health living activities while playing [address]. Notes: Pt participated in group. Status After Intervention:  Unchanged    Participation Level:  Active Listener    Participation Quality: Appropriate      Speech:  normal      Thought Process/Content: Logical      Affective Functioning: Congruent      Mood: euthymic      Level of consciousness:  Alert      Response to Learning: Able to verbalize current knowledge/experience      Endings: None Reported    Modes of Intervention: Education      Discipline Responsible: [address], [facility] [employer]      Signature:  Na Rutledge

## 2022-01-12 NOTE — GROUP NOTE
Group Therapy Note    Date: 1/11/2022    Group Start Time: 2045  Group End Time: 2130  Group Topic: Recreational    [facility]    Margie Rivera        Group Therapy Note    Attendees:7/18       Patient's Goal:To play Wii bowling and socailize with peers. Notes:  Pt participated in group activity. Status After Intervention:  Improved    Participation Level:  Active Listener    Participation Quality: Appropriate      Speech:  normal      Thought Process/Content: Logical      Affective Functioning: Congruent      Mood: euthymic      Level of consciousness:  Alert      Response to Learning: Able to verbalize current knowledge/experience      Endings: None Reported    Modes of Intervention: Activity      Discipline Responsible: [address], [employer]      Signature:  Margie Rivera

## 2022-01-12 NOTE — PROGRESS NOTES
[facility]. FOLLOW-UP NOTE       1/12/2022     Patient was seen and examined in person, Chart reviewed   Patient's case discussed with staff/team    Chief Complaint: Psychosis    Interim History:     Pt feel better  No side effects from medication  Poor sleep last night   Restless and anxious at night time  Less paranoid  Want to go to sober living    Appetite:   [x] Normal/Unchanged  [] Increased  [] Decreased      Sleep:       [] Normal/Unchanged  [x] Fair       [] Poor              Energy:    [x] Normal/Unchanged  [] Increased  [] Decreased        SI [] Present  [x] Absent    HI  []Present  [x] Absent     Aggression:  [] yes  [] no    Patient is [] able  [] unable to CONTRACT FOR SAFETY     PAST MEDICAL/PSYCHIATRIC HISTORY:   History reviewed. No pertinent past medical history. FAMILY/SOCIAL HISTORY:  History reviewed. No pertinent family history. Social History     Socioeconomic History    Marital status: Single     Spouse name: Not on file    Number of children: Not on file    Years of education: Not on file    Highest education level: Not on file   Occupational History    Not on file   Tobacco Use    Smoking status: Current Every Day Smoker     Packs/day: 0.75     Years: 15.00     Pack years: 11.25     Types: Cigarettes    Smokeless tobacco: Never Used   Vaping Use    Vaping Use: Never used   Substance and Sexual Activity    Alcohol use: Never    Drug use: Yes     Types: Methamphetamines (Crystal Meth)    Sexual activity: Not on file   Other Topics Concern    Not on file   Social History Narrative    Not on file     Social Determinants of Health     Financial Resource Strain:     Difficulty of Paying Living Expenses: Not on file   Food Insecurity:     Worried About Running Out of Food in the Last Year: Not on file    Rina of Food in the Last Year: Not on file   Transportation Needs:     Lack of Transportation (Medical):  Not on file    Lack of Transportation (Non-Medical): Not on file   Physical Activity:     Days of Exercise per Week: Not on file    Minutes of Exercise per Session: Not on file   Stress:     Feeling of Stress : Not on file   Social Connections:     Frequency of Communication with Friends and Family: Not on file    Frequency of Social Gatherings with Friends and Family: Not on file    Attends [employer] Services: Not on file    Active Member of [address] [place] [address] or Organizations: Not on file    Attends Club or Organization Meetings: Not on file    Marital Status: Not on file   Intimate Partner Violence:     Fear of Current or Ex-Partner: Not on file    Emotionally Abused: Not on file    Physically Abused: Not on file    Sexually Abused: Not on file   Housing Stability:     Unable to Pay for Housing in the Last Year: Not on file    Number of [place] in the Last Year: Not on file    Unstable Housing in the Last Year: Not on file           ROS:  [x] All negative/unchanged except if checked.  Explain positive(checked items) below:  [] Constitutional  [] Eyes  [] Ear/Nose/Mouth/Throat  [] Respiratory  [] CV  [] GI  []   [] Musculoskeletal  [] Skin/Breast  [] Neurological  [] Endocrine  [] Heme/Lymph  [] Allergic/Immunologic    Explanation:     MEDICATIONS:    Current Facility-Administered Medications:     risperiDONE (RISPERDAL) tablet 1.5 mg, 1.5 mg, Oral, BID, Kaylie Lindquist MD, 1.5 mg at 01/12/22 0856    acetaminophen (TYLENOL) tablet 650 mg, 650 mg, Oral, Q4H PRN, Ivette Mclaughlin MD    magnesium hydroxide (MILK OF MAGNESIA) 400 MG/5ML suspension 30 mL, 30 mL, Oral, Daily PRN, Ivette Mclaughlin MD    aluminum & magnesium hydroxide-simethicone (MAALOX) 200-200-20 MG/5ML suspension 30 mL, 30 mL, Oral, PRN, Ivette Mclaughlin MD    haloperidol (HALDOL) tablet 5 mg, 5 mg, Oral, Q6H PRN **OR** haloperidol lactate (HALDOL) injection 5 mg, 5 mg, IntraMUSCular, Q6H PRN, Ivette Mclaughlin MD    benztropine mesylate (COGENTIN) injection 2 mg, 2 mg, IntraMUSCular, BID PRN, Dianne Stone MD    hydrOXYzine (VISTARIL) injection 50 mg, 50 mg, IntraMUSCular, Q6H PRN **OR** hydrOXYzine (VISTARIL) capsule 50 mg, 50 mg, Oral, Q6H PRN, Dianne Stone MD, 50 mg at 01/07/22 0825    traZODone (DESYREL) tablet 50 mg, 50 mg, Oral, Nightly PRN, Dianne Stone MD, 50 mg at 01/11/22 2052    nicotine (NICODERM CQ) 21 MG/24HR 1 patch, 1 patch, TransDERmal, Daily, Dianne Stone MD    influenza quadrivalent split vaccine (FLUZONE;FLUARIX;FLULAVAL;AFLURIA) injection 0.5 mL, 0.5 mL, IntraMUSCular, Prior to discharge, Suzi Grove, APRN - NP      Examination:  BP (!) 114/54 Comment: RN Notified  Pulse 112   Temp 98.7 °F (37.1 °C) (Oral)   Resp 18   Ht 5' 11\" (1.803 m)   Wt 165 lb (74.8 kg)   SpO2 97%   BMI 23.01 kg/m²   Gait - steady  Medication side effects(SE): no    Mental Status Examination:    Level of consciousness:  within normal limits   Appearance:  fair grooming and fair hygiene  Behavior/Motor:  no abnormalities noted  Attitude toward examiner:  cooperative  Speech:  spontaneous   Mood: dysthymic  Affect:  blunted  Thought processes:  linear   Thought content:  Delusions:  Less paranoid  Cognition:  oriented to person, place, and time   Concentration intact  Insight fair   Judgement fair     ASSESSMENT:   Patient symptoms are:  [] Well controlled  [] Improving  [] Worsening  [] No change      Diagnosis:   Principal Problem:    Delusional disorder ([facility]  Active Problems:    Stimulant use disorder  Resolved Problems:    * No resolved hospital problems. *      LABS:    No results for input(s): WBC, HGB, PLT in the last 72 hours. No results for input(s): NA, K, CL, CO2, BUN, CREATININE, GLUCOSE in the last 72 hours. No results for input(s): BILITOT, ALKPHOS, AST, ALT in the last 72 hours.   Lab Results   Component Value Date    [address] POSITIVE 01/06/2022    BARBSCNU Neg 01/06/2022    LABBENZ Neg 01/06/2022    LABMETH Neg 01/06/2022    OPIATESCREENURINE Neg 01/06/2022    PHENCYCLIDINESCREENURINE Neg 01/06/2022    ETOH <10 01/06/2022     Lab Results   Component Value Date    TSH 2.240 01/06/2022     No results found for: LITHIUM  No results found for: VALPROATE, CBMZ      Treatment Plan:  Reviewed current Medications with the patient. Med as ordered  Risks, benefits, side effects, drug-to-drug interactions and alternatives to treatment were discussed. Collateral information: pending  CD evaluation  Encourage patient to attend group and other milieu activities. Discharge planning discussed with the patient and treatment team.    PSYCHOTHERAPY/COUNSELING:  [x] Therapeutic interview  [x] Supportive  [] CBT  [] Ongoing  [] Other      Patient was seen 1:1 for 20 minutes, other than E&M time spent, focusing on      - coping skills techniques     - Anxiety management techniques discussed including deep breathing exercise and PMR     - discussing patients strength and weakness      - Motivational interviewing to assess the stage of change and assessing patient readiness to quit substance use.      - Focusing on negative cognition and maladaptive thoughts, which is feeding and maintaining the depression symptoms      [x] Patient continues to need, on a daily basis, active treatment furnished directly by or requiring the supervision of inpatient psychiatric personnel      Anticipated Length of stay:            Electronically signed by Jesus Sibley MD on 1/12/2022 at 10:08 AM

## 2022-01-12 NOTE — CARE COORDINATION
Spoke with Eliana Chong from [facility]. Patient was accepted into sober living at [facility] will pick patient up at 11am tomorrow.

## 2022-01-12 NOTE — GROUP NOTE
Group Therapy Note    Date: 1/12/2022    Group Start Time: 1300  Group End Time: 3247  Group Topic: Healthy Living/Wellness    [facility]    Dasha Wooten RN; Skylar Acuna RN        Group Therapy Note    Attendees: 5         Patient's Goal:  To learn more about self esteem and ways to improve it    Notes:  Pt actively and appropriately participated    Status After Intervention:  Unchanged    Participation Level:  Active Listener and Interactive    Participation Quality: Appropriate, Attentive, Sharing and Supportive      Speech:  normal      Thought Process/Content: Delusional      Affective Functioning: Congruent      Mood: euthymic      Level of consciousness:  Alert and Oriented x4      Response to Learning: Able to verbalize current knowledge/experience      Endings: None Reported    Modes of Intervention: Education, Support, Socialization and Problem-solving      Discipline Responsible: Registered Nurse      Signature:  Dasha Wooten RN

## 2022-01-12 NOTE — CARE COORDINATION
Patient did not attend Psychoeducational group on [address] at 1400 despite staff encouragement.     .Electronically signed by CLAUDETTE Weir on 1/12/2022 at 3:24 PM

## 2022-01-12 NOTE — GROUP NOTE
Group Therapy Note    Date: 1/12/2022    Group Start Time: 9846  Group End Time: 1700  Group Topic: Healthy Living/Wellness    [facility] LIONEL Vargas        Group Therapy Note    Attendees: 8/16         Patient's Goal:  To state positive attributes. Notes:  Patient participated in group discussion.     Status After Intervention:  Improved    Participation Level: Interactive    Participation Quality: Appropriate, Attentive and Sharing      Speech:  normal      Thought Process/Content: Logical      Affective Functioning: Congruent      Mood: euthymic      Level of consciousness:  Alert and Attentive      Response to Learning: Able to verbalize current knowledge/experience      Endings: None Reported    Modes of Intervention: Education      Discipline Responsible: [address], [address] [employer]      Signature:  Yamilet Vargas

## 2022-01-13 PROCEDURE — 6370000000 HC RX 637 (ALT 250 FOR IP): Performed by: PSYCHIATRY & NEUROLOGY

## 2022-01-13 PROCEDURE — 90686 IIV4 VACC NO PRSV 0.5 ML IM: CPT | Performed by: NURSE PRACTITIONER

## 2022-01-13 PROCEDURE — 6360000002 HC RX W HCPCS: Performed by: NURSE PRACTITIONER

## 2022-01-13 PROCEDURE — 99239 HOSP IP/OBS DSCHRG MGMT >30: CPT | Performed by: PSYCHIATRY & NEUROLOGY

## 2022-01-13 PROCEDURE — G0008 ADMIN INFLUENZA VIRUS VAC: HCPCS | Performed by: NURSE PRACTITIONER

## 2022-01-13 RX ORDER — RISPERIDONE 0.5 MG/1
1.5 TABLET, FILM COATED ORAL 2 TIMES DAILY
Qty: 90 TABLET | Refills: 3 | Status: SHIPPED | OUTPATIENT
Start: 2022-01-13

## 2022-01-13 RX ORDER — TRAZODONE HYDROCHLORIDE 100 MG/1
100 TABLET ORAL NIGHTLY
Qty: 15 TABLET | Refills: 2 | Status: SHIPPED | OUTPATIENT
Start: 2022-01-13

## 2022-01-13 RX ADMIN — INFLUENZA A VIRUS A/VICTORIA/2570/2019 IVR-215 (H1N1) ANTIGEN (PROPIOLACTONE INACTIVATED), INFLUENZA A VIRUS A/CAMBODIA/E0826360/2020 IVR-224 (H3N2) ANTIGEN (PROPIOLACTONE INACTIVATED), INFLUENZA B VIRUS B/VICTORIA/705/2018 BVR-11 ANTIGEN (PROPIOLACTONE INACTIVATED), INFLUENZA B VIRUS B/PHUKET/3073/2013 BVR-1B ANTIGEN (PROPIOLACTONE INACTIVATED) 0.5 ML: 15; 15; 15; 15 INJECTION, SUSPENSION INTRAMUSCULAR at 10:24

## 2022-01-13 RX ADMIN — RISPERIDONE 1.5 MG: 1 TABLET ORAL at 08:51

## 2022-01-13 NOTE — GROUP NOTE
Group Therapy Note    Date: 1/12/2022    Group Start Time: 2100  Group End Time: 2110  Group Topic: Wrap-Up    [facility]    Pieter Grief        Group Therapy Note    Attendees: 8/18         Patient's Goal:  \"to take 4 showers\" and \"be more jovial\"    Notes:  Patient reported meeting their goals for the day. Patient stated he is happy he got a discharge date of next Friday.     Status After Intervention:  Unchanged    Participation Level: Interactive    Participation Quality: Appropriate, Attentive and Sharing      Speech:  pressured      Thought Process/Content: Linear      Affective Functioning: Congruent      Mood: euthymic      Level of consciousness:  Alert and Attentive      Response to Learning: Progressing to goal      Endings: None Reported    Modes of Intervention: Support      Discipline Responsible: [employer]      Signature:  Pieter Spencer

## 2022-01-13 NOTE — GROUP NOTE
Group Therapy Note    Date: 1/12/2022    Group Start Time: 1945  Group End Time: 2030  Group Topic: Recreational    [facility]    Nilam Guerrero        Group Therapy Note    Attendees: 8/17         Patient's Goal:  To watch tv, play a game, or socialize with peers. Notes:  Patient actively participated in activity group.     Status After Intervention:  Unchanged    Participation Level: Interactive    Participation Quality: Appropriate and Attentive      Speech:  normal      Thought Process/Content: Logical      Affective Functioning: Congruent      Mood: euthymic      Level of consciousness:  Alert and Attentive      Response to Learning: Progressing to goal      Endings: None Reported    Modes of Intervention: Activity      Discipline Responsible: [address], [employer] Concierge      Signature:  Nilam Guerrero

## 2022-01-13 NOTE — PROGRESS NOTES
Morning [facility] 65 attended the morning community meeting on 1/13/22. Topics discussed today     [x] Introduction   Day of the week and date   Mask distribution   Current mask requirements  [x]Teams   Explanation of  Green and Blue team criteria   Nurses assigned to each team for today   Explanation about green and blue paper  o Date  o Patient's Name  o Patient's Nurse  o Goals  [x] Visitation   Announce the visiting hours for the day   Announce which team is allowed to have visitors for the day   Review any updated Covid 19 requirements for visitors during visitation  o Vaccine Card or negative Covid test within 48 hours of visit  o State Identification   Patients are reminded to alert the  at least 1 hour before visitation   [x] Unit Orientation   Coffee use   Phone location and etiquette   Shower locations  [employer] and dryer location and process   Common area expectations   Staff rounds expectation  [x] Meals    Educate patient to the menu  o The patient is encouraged to fill out the menu to get preferences at mealtime  o The patient is educated that if they do not fill out the menu, they will get the standard tray  o The coffee pot is decaf, patient encouraged to order regular coffee from menu.    Educate patient to the meal process   Patient encouraged to eat snacks provided twice daily  o Snacks may stay in patient room     [x] Discharge Process   Discharge expectations   Fill out the survey after discharge   [x] Hygiene   Daily showers encouraged  o Showers availability discussed    Daily dressing encouraged  o Discussed wearing street clothing   Education provided on where to place linens and clothing  o Linens in the hamper  o personal clothing does not go into the linen hamper  [x] Group    Patient encouraged to attend group provided   Time of Group Meetings discussed   Gentle reminder that attendance is a Physician order  [x] Movement   Chair exercises completed   Stretching completed  Notes:Goal - \"To go home\" Electronically signed by DOREEN Ruiz on 1/13/2022 at 9:44 AM

## 2022-01-13 NOTE — DISCHARGE SUMMARY
DISCHARGE SUMMARY      Patient ID:  Gordon Gandara  [id number]  39 y.o.  1980      Admit date: 1/6/2022    Discharge date and time: 1/13/2022    Admitting Physician: Dianne Stone MD     Discharge Physician: Dr Wilma Alcantara MD    Admission Diagnoses: Psychosis, alcoholic, with unspecified complication ([facility] [V89.250]  Psychosis, unspecified psychosis type ([facility] [F29]    Admission Condition: poor    Discharged Condition: stable    Admission Circumstance:        Pt is a poor historian and paranoia compounds that , Pt gesturing that he feels unsafe discussing anything around the peers in the room using unofficial but clear sign language. Did state that his mother and father live in [place] but refused to discuss further and states he wants no one contacted. He told triage and this write he is not from here and arrived from unknown location on a [employer] boat. He believes he has a hit out on him from the [employer]. He denies personal psychiatric history other than ADD, denies previous psychiatric hospitalizations but reports his mother is paranoid schizophrenic. Pt reports past methamphetamines addiction with relapse 24 hours ago with several uses over the last few hours . Patient present with tremulous jerky movements and dilated pupils.      HISTORY OF PRESENT ILLNESS:       The patient is a 39 y.o. male single homeless unemployed with no significant past history       Pt was feeling depressed and suicidal for past few 4 years- getting worse  Was planning to jump off a bridge yesterday- went to a bridge. Quirino Cords wife who was passing by, convinced him not to jump and called the . Appeared paranoid during interview  I am running off from drug cartel. People are trying to kill me  Has been using Methamphetamine - last use was yesterday, relapsed for the first time in a year.   Pt is not forthcoming with information  Nobody believe me, thinking I am delusional and hallucinating  No point explain to you again  Thought Alcohol use: Never    Drug use: Yes     Types: Methamphetamines (Crystal Meth)    Sexual activity: Not on file   Other Topics Concern    Not on file   Social History Narrative    Not on file     Social Determinants of Health     Financial Resource Strain:     Difficulty of Paying Living Expenses: Not on file   Food Insecurity:     Worried About Running Out of Food in the Last Year: Not on file    Rina of Food in the Last Year: Not on file   Transportation Needs:     Lack of Transportation (Medical): Not on file    Lack of Transportation (Non-Medical):  Not on file   Physical Activity:     Days of Exercise per Week: Not on file    Minutes of Exercise per Session: Not on file   Stress:     Feeling of Stress : Not on file   Social Connections:     Frequency of Communication with Friends and Family: Not on file    Frequency of Social Gatherings with Friends and Family: Not on file    Attends Mosque Services: Not on file    Active Member of [address] [place] [address] or Organizations: Not on file    Attends Club or Organization Meetings: Not on file    Marital Status: Not on file   Intimate Partner Violence:     Fear of Current or Ex-Partner: Not on file    Emotionally Abused: Not on file    Physically Abused: Not on file    Sexually Abused: Not on file   Housing Stability:     Unable to Pay for Housing in the Last Year: Not on file    Number of [place] in the Last Year: Not on file    Unstable Housing in the Last Year: Not on file       MEDICATIONS:    Current Facility-Administered Medications:     traZODone (DESYREL) tablet 100 mg, 100 mg, Oral, Nightly, Chandrika Choudhury MD, 100 mg at 01/12/22 2047    risperiDONE (RISPERDAL) tablet 1.5 mg, 1.5 mg, Oral, BID, Annie Arnold MD, 1.5 mg at 01/13/22 0851    acetaminophen (TYLENOL) tablet 650 mg, 650 mg, Oral, Q4H PRN, Chandrika Choudhury MD    magnesium hydroxide (MILK OF MAGNESIA) 400 MG/5ML suspension 30 mL, 30 mL, Oral, Daily PRN, Prashanth Bennett Luca Daniels MD    aluminum & magnesium hydroxide-simethicone (MAALOX) 200-200-20 MG/5ML suspension 30 mL, 30 mL, Oral, PRN, Janet Mckeon MD    haloperidol (HALDOL) tablet 5 mg, 5 mg, Oral, Q6H PRN **OR** haloperidol lactate (HALDOL) injection 5 mg, 5 mg, IntraMUSCular, Q6H PRN, Janet Mckeon MD    benztropine mesylate (COGENTIN) injection 2 mg, 2 mg, IntraMUSCular, BID PRN, Janet Mckeon MD    hydrOXYzine (VISTARIL) injection 50 mg, 50 mg, IntraMUSCular, Q6H PRN **OR** hydrOXYzine (VISTARIL) capsule 50 mg, 50 mg, Oral, Q6H PRN, Janet Mckeon MD, 50 mg at 01/07/22 0825    nicotine (NICODERM CQ) 21 MG/24HR 1 patch, 1 patch, TransDERmal, Daily, Janet Mckeon MD    influenza quadrivalent split vaccine (FLUZONE;FLUARIX;FLULAVAL;AFLURIA) injection 0.5 mL, 0.5 mL, IntraMUSCular, Prior to discharge, SOHAIL Bunn - NP    Examination:  BP (!) 114/54 Comment: RN Notified  Pulse 112   Temp 98.7 °F (37.1 °C) (Oral)   Resp 18   Ht 5' 11\" (1.803 m)   Wt 165 lb (74.8 kg)   SpO2 97%   BMI 23.01 kg/m²   Gait - steady    HOSPITAL COURSE[de-identified]  Following admission to the hospital, patient had a complete physical exam and blood work up  Patient was monitored closely with suicide precaution  Patient was started on meds as listed below  Was encouraged to participate in group and other milieu activity  Patient started to feel better with this combination of treatment. Significant progress in the symptoms since admission.     Mood better, with the score of 2/10 - bad  No AVH   Still has paranoid thoughts about drug cartel but less intense  No Hopeless or worthless feeling  No active SI/HI  Appetite:  [x] Normal  [] Increased  [] Decreased    Sleep:       [x] Normal  [] Fair       [] Poor            Energy:    [x] Normal  [] Increased  [] Decreased     SI [] Present  [x] Absent  HI  []Present  [x] Absent   Aggression:  [] yes  [] no  Patient is [x] able  [] unable to CONTRACT FOR SAFETY   Medication side effects(SE):  [x] None(Psych. Meds.) [] Other      Mental Status Examination on discharge:    Level of consciousness:  within normal limits   Appearance:  well-appearing  Behavior/Motor:  no abnormalities noted  Attitude toward examiner:  attentive and good eye contact  Speech:  spontaneous, normal rate and normal volume   Mood: euthymic  Affect:  anxious  Thought processes:  coherent   Thought content:  Delusions:  paranoid  Cognition:  oriented to person, place, and time   Concentration intact  Memory intact  Insight good   Judgement fair   Fund of Knowledge adequate      ASSESSMENT:  Patient symptoms are:  [x] Well controlled  [x] Improving  [] Worsening  [] No change      Diagnosis:  Principal Problem:    Delusional disorder ([facility]  Active Problems:    Stimulant use disorder  Resolved Problems:    * No resolved hospital problems. *      LABS:    No results for input(s): WBC, HGB, PLT in the last 72 hours. No results for input(s): NA, K, CL, CO2, BUN, CREATININE, GLUCOSE in the last 72 hours. No results for input(s): BILITOT, ALKPHOS, AST, ALT in the last 72 hours. Lab Results   Component Value Date    [address] POSITIVE 01/06/2022    BARBSCNU Neg 01/06/2022    LABBENZ Neg 01/06/2022    LABMETH Neg 01/06/2022    OPIATESCREENURINE Neg 01/06/2022    PHENCYCLIDINESCREENURINE Neg 01/06/2022    ETOH <10 01/06/2022     Lab Results   Component Value Date    TSH 2.240 01/06/2022     No results found for: LITHIUM  No results found for: VALPROATE, CBMZ    RISK ASSESSMENT AT DISCHARGE: Low risk for suicide and homicide. Treatment Plan:  Reviewed current Medications with the patient. Education provided on the complaince with treatment. Risks, benefits, side effects, drug-to-drug interactions and alternatives to treatment were discussed. Encourage patient to attend outpatient follow up appointment and therapy.     Patient was advised to call the outpatient provider, visit the nearest ED or call 911 if symptoms are not manageable. Patient's family member was contacted prior to the discharge.          Medication List      START taking these medications    risperiDONE 0.5 MG tablet  Commonly known as: RISPERDAL  Take 3 tablets by mouth 2 times daily     traZODone 100 MG tablet  Commonly known as: DESYREL  Take 1 tablet by mouth nightly           Where to Get Your Medications      You can get these medications from any pharmacy    Bring a paper prescription for each of these medications  · risperiDONE 0.5 MG tablet  · traZODone 100 MG tablet           Reason for more than one antipsychotic:   [x] N/A  [] 3 failed monotherapy(drugs tried):  [] Cross over to a new antipsychotic  [] Taper to monotherapy from polypharmacy  [] Augmentation of Clozapine therapy due to treatment resistance to single therapy        TIME SPEND - 35 MINUTES TO COMPLETE THE EVALUATION, DISCHARGE SUMMARY, MEDICATION RECONCILIATION AND FOLLOW UP CARE     Signed:  Lorella Schilder, MD  1/13/2022  9:41 AM

## 2022-01-13 NOTE — GROUP NOTE
Group Therapy Note    Date: 1/13/2022    Group Start Time: 1005  Group End Time: 1050  Group Topic: Psychoeducation    [facility]    Jaya Delgado        Group Therapy Note    Attendees: 9         Patient's Goal:  \"To get discharge\"    Notes:  Patient attended the 1000 skills group. Patient was talkative and attention seeking at times but in control. He would walk in and out of group. He work minimal on his task.     Status After Intervention:  Unchanged    Participation Level: Minimal    Participation Quality: adequate      Speech:  talkative      Thought Process/Content: Linear      Affective Functioning: Congruent      Mood: calmer      Level of consciousness:  Alert      Response to Learning: Able to retain information      Endings: None Reported    Modes of Intervention: Education, Socialization and Activity      Discipline Responsible: Psychoeducational Specialist      Signature:  Jaya Delgado

## 2022-01-13 NOTE — DISCHARGE INSTR - PHARMACY
You are getting 2 weeks of medication to take with you  After this you can take the bottles to whichever pharmacy you choose/ please use good RX card

## 2022-01-13 NOTE — PROGRESS NOTES
CLINICAL PHARMACY NOTE: MEDS TO BEDS    Total # of Prescriptions Filled: 2   The following medications were delivered to the patient:  · Trazodone 100 mg Tab  · Risperidone 0.5 mg Tab    Additional Documentation:

## 2022-01-13 NOTE — DISCHARGE INSTR - DIET
Good nutrition is important when healing from an illness, injury, or surgery. Follow any nutrition recommendations given to you during your hospital stay. If you were given an oral nutrition supplement while in the hospital, continue to take this supplement at home. You can take it with meals, in-between meals, and/or before bedtime. These supplements can be purchased at most local grocery stores, pharmacies, and chain [employer]-stores. If you have any questions about your diet or nutrition, call the hospital and ask for the dietitian.   As tolerated

## 2022-01-13 NOTE — CARE COORDINATION
Discharge instructions reviewed verbally and in writing including f/u appointments. Patient verbalizes understanding and signed as such. All belongings returned for discharge. Patient denies SI, HI, A/V hallucinations, mood is stable.    Discharged with [facility] for transport to [facility] living and taking with him 2 weeks of new medications

## 2022-01-13 NOTE — GROUP NOTE
Group Therapy Note    Date: 1/13/2022    Group Start Time: 1100  Group End Time: 1130  Group Topic: Psychoeducation    [facility]    ROSA Nguyen LSW        Group Therapy Note    Attendees: 10         Patient's Goal:  To participate in a psychoeducational group therapy    Notes:  Patient shared with the group that he will be discharging today. He plans to continue outside services after discharge.      Status After Intervention:  Improved    Participation Level: Interactive    Participation Quality: Appropriate      Speech:  normal      Thought Process/Content: Logical      Affective Functioning: Congruent      Mood: calm      Level of consciousness:  Alert      Response to Learning: Able to verbalize current knowledge/experience      Endings: None Reported    Modes of Intervention: Education      Discipline Responsible: /Counselor      Signature:  ROSA Nguyen LSW

## 2022-01-13 NOTE — PROGRESS NOTES
Patient is given the flu vaccine into the left deltoid area with no complaint.  Electronically signed by Heavenly Childs LPN on 2/06/7507 at 75:16 AM